# Patient Record
Sex: FEMALE | Race: OTHER | HISPANIC OR LATINO
[De-identification: names, ages, dates, MRNs, and addresses within clinical notes are randomized per-mention and may not be internally consistent; named-entity substitution may affect disease eponyms.]

---

## 2022-02-17 PROBLEM — Z00.00 ENCOUNTER FOR PREVENTIVE HEALTH EXAMINATION: Status: ACTIVE | Noted: 2022-02-17

## 2022-02-23 ENCOUNTER — RESULT REVIEW (OUTPATIENT)
Age: 67
End: 2022-02-23

## 2022-02-23 ENCOUNTER — OUTPATIENT (OUTPATIENT)
Dept: OUTPATIENT SERVICES | Facility: HOSPITAL | Age: 67
LOS: 1 days | End: 2022-02-23
Payer: COMMERCIAL

## 2022-02-23 ENCOUNTER — APPOINTMENT (OUTPATIENT)
Dept: ORTHOPEDIC SURGERY | Facility: CLINIC | Age: 67
End: 2022-02-23
Payer: MEDICARE

## 2022-02-23 VITALS
OXYGEN SATURATION: 98 % | BODY MASS INDEX: 29.02 KG/M2 | HEIGHT: 64 IN | WEIGHT: 170 LBS | DIASTOLIC BLOOD PRESSURE: 80 MMHG | SYSTOLIC BLOOD PRESSURE: 117 MMHG | HEART RATE: 75 BPM

## 2022-02-23 DIAGNOSIS — Z78.9 OTHER SPECIFIED HEALTH STATUS: ICD-10-CM

## 2022-02-23 DIAGNOSIS — Z82.61 FAMILY HISTORY OF ARTHRITIS: ICD-10-CM

## 2022-02-23 DIAGNOSIS — Z60.2 PROBLEMS RELATED TO LIVING ALONE: ICD-10-CM

## 2022-02-23 DIAGNOSIS — Z86.79 PERSONAL HISTORY OF OTHER DISEASES OF THE CIRCULATORY SYSTEM: ICD-10-CM

## 2022-02-23 PROCEDURE — 72170 X-RAY EXAM OF PELVIS: CPT

## 2022-02-23 PROCEDURE — 73564 X-RAY EXAM KNEE 4 OR MORE: CPT | Mod: 26,50

## 2022-02-23 PROCEDURE — 99204 OFFICE O/P NEW MOD 45 MIN: CPT

## 2022-02-23 PROCEDURE — 73564 X-RAY EXAM KNEE 4 OR MORE: CPT

## 2022-02-23 PROCEDURE — 72170 X-RAY EXAM OF PELVIS: CPT | Mod: 26

## 2022-02-23 SDOH — SOCIAL STABILITY - SOCIAL INSECURITY: PROBLEMS RELATED TO LIVING ALONE: Z60.2

## 2022-02-24 PROBLEM — Z60.2 PERSON LIVING ALONE: Status: ACTIVE | Noted: 2022-02-23

## 2022-02-24 PROBLEM — Z86.79 HISTORY OF HYPERTENSION: Status: RESOLVED | Noted: 2022-02-23 | Resolved: 2022-02-24

## 2022-02-24 PROBLEM — Z82.61 FAMILY HISTORY OF ARTHRITIS: Status: ACTIVE | Noted: 2022-02-23

## 2022-02-24 PROBLEM — Z78.9 CONSUMES ALCOHOL OCCASIONALLY: Status: ACTIVE | Noted: 2022-02-23

## 2022-02-24 PROBLEM — Z78.9 EXERCISES OCCASIONALLY: Status: ACTIVE | Noted: 2022-02-23

## 2022-02-24 NOTE — DISCUSSION/SUMMARY
[de-identified] : 65y/o female with bilateral knee osteoarthritis, symptoms similar though with right side more stiff and with radiographically advanced OA than left\par - She is indicated for staged bilateral TKA, Maylin robotic assisted, starting with the RIGHT side first\par - We discussed the details of the procedure, the expected recovery period, and the expected outcome. We discussed the likelihood of satisfaction after complete recovery, and the potential causes of dissatisfaction. The importance of active patient participation in the rehabilitation protocol was emphasized, along with its influence on short and long-term outcomes. Specific risks of total knee replacement were discussed in detail. We discussed the risk of surgical site complications including but not limited to: surgical site infection, wound healing complications, bone fracture, tendon or ligament injury, neurovascular injury, hemorrhage, postoperative stiffness or instability, persistent pain and need for reoperation or manipulation under anesthesia. We discussed surgical blood loss and the possible need for blood transfusion. We discussed the risk of perioperative medical complications, including but not limited to catheter-associated urinary tract infection, venous thromboembolism and other cardiopulmonary complications. We discussed anesthetic options and the risk of anesthesia-related complications. We discussed implant fixation methods; my plan would be to use fully cemented fixation in this case. We discussed the variable need to resurface the patella; my plan would be to resurface in this case. We discussed the durability of prosthetic knees and limitations related to wear, osteolysis and loosening. We discussed the role of the robot in helping to guide bone resections and measure alignment and soft tissue balance. All questions were answered the patient's satisfaction. The patient was given a copy of my preoperative packet with additional information about the procedure. I asked the patient to either call back or schedule a followup appointment for any additional questions or concerns regarding the procedure.\par - Book for surgery at a convenient time\par - Stage left side following appropriate postop recovery from the right\par - Standard medical clearance preoperatively\par - Tylenol and celecoxib as needed

## 2022-02-24 NOTE — REASON FOR VISIT
[Initial Visit] : an initial visit for [Other: ____] : [unfilled] [Interpreters_IDNumber] : [id number] [Interpreters_FullName] : Bhargavi [TWNoteComboBox1] : Grenadian

## 2022-02-24 NOTE — HISTORY OF PRESENT ILLNESS
[Worsening] : worsening [___ yrs] : [unfilled] year(s) ago [10] : a current pain level of 10/10 [Walking] : worsened by walking [Heat] : relieved by heat [de-identified] : 2/23/22: 65y/o female, Grenadian speaking, accompanied by grandson for evaluation of bilateral knee pain and stiffness. Symptoms progressive for the past 4-5 years. Treatments thus far have included PT, Tylenol, various OTC NSAIDs, multiple corticosteroid injections, and bilateral knee arthroscopies (right 2017, left 2018). Complains of ongoing mostly medial pain with any activity. Ambulatory tolerance less than a block but remains able to perform ADLs with pain. Here to discuss TKA.\par \par PMH sig for HTN.\par \par Reports itchiness upon administration of penicillin without anaphylactoid reactions. [de-identified] : describes cramping and shooting pain

## 2022-02-24 NOTE — PHYSICAL EXAM
[de-identified] : General appearance: well nourished and hydrated, pleasant, alert and oriented x 3, cooperative.  \par HEENT: normocephalic, EOM intact, wearing mask, external auditory canal clear.  \par Cardiovascular: no lower leg edema, no varicosities, dorsalis pedis pulses palpable and symmetric.  \par Lymphatics: no palpable lymphadenopathy, no lymphedema.  \par Neurologic: sensation is normal, no muscle weakness in upper or lower extremities, patella tendon reflexes present and symmetric.  \par Dermatologic: skin moist, warm, no rash.  \par Spine: cervical spine with normal lordosis and painless range of motion, thoracic spine with normal kyphosis and painless range of motion, lumbosacral spine with normal lordosis and painless range of motion. \par Gait: bilaterally antalgic\par \par Left knee: all fields negative/normal/unremarkable unless otherwise noted --\par - Focal soft tissue swelling: \par - Ecchymosis: \par - Erythema: \par - Effusion: small\par - Wounds: healed arthroscopic portals\par - Alignment: mild varus\par - Tenderness: diffuse, worst at medial joint line\par - ROM: 0-125\par - Collateral laxity: \par - Cruciate laxity: \par - Meniscal signs: painful Markie and Tracie.  \par - Popliteal angle (degrees): 40\par - Quad strength: \par \par Right knee: all fields negative/normal/unremarkable unless otherwise noted --\par - Focal soft tissue swelling: \par - Ecchymosis: \par - Erythema: \par - Effusion: small\par - Wounds: healed arthroscopic portals\par - Alignment: mild varus\par - Tenderness: diffuse, worst at medial joint line\par - ROM: 5-120\par - Collateral laxity: \par - Cruciate laxity: \par - Meniscal signs: painful Markie and Tracie.  \par - Popliteal angle (degrees): 40\par - Quad strength:  [de-identified] : AP pelvis and 4 views of the bilateral knees (weightbearing AP, weightbearing Orr, weightbearing lateral, and Sunrise) were interpreted by me and reviewed with the patient.\par \par Location of imaging: [facility]\par Date of exam: 2/23/22\par \par Pelvic alignment: relatively outlet\par \par Right hip -- \par Arthritis: none\par Deformity: none\par Osteonecrosis: none\par \par Left hip -- \par Arthritis: none\par Deformity: none\par Osteonecrosis: none\par \par Right knee -- \par Alignment: mild varus\par Arthritis: tricompartmental worst medial, KL4\par Patellar height: normal\par Patellar tracking: central\par \par Left knee -- \par Alignment: mild varus\par Arthritis: tricompartmental worst medial, KL3-4\par Patellar height: normal\par Patellar tracking: central

## 2022-02-24 NOTE — HISTORY OF PRESENT ILLNESS
[Worsening] : worsening [___ yrs] : [unfilled] year(s) ago [10] : a current pain level of 10/10 [Walking] : worsened by walking [Heat] : relieved by heat [de-identified] : 2/23/22: 65y/o female, Barbadian speaking, accompanied by grandson for evaluation of bilateral knee pain and stiffness. Symptoms progressive for the past 4-5 years. Treatments thus far have included PT, Tylenol, various OTC NSAIDs, multiple corticosteroid injections, and bilateral knee arthroscopies (right 2017, left 2018). Complains of ongoing mostly medial pain with any activity. Ambulatory tolerance less than a block but remains able to perform ADLs with pain. Here to discuss TKA.\par \par PMH sig for HTN.\par \par Reports itchiness upon administration of penicillin without anaphylactoid reactions. [de-identified] : describes cramping and shooting pain

## 2022-02-24 NOTE — REASON FOR VISIT
[Initial Visit] : an initial visit for [Other: ____] : [unfilled] [Interpreters_IDNumber] : [id number] [Interpreters_FullName] : Bhargavi [TWNoteComboBox1] : Italian

## 2022-02-24 NOTE — DISCUSSION/SUMMARY
[de-identified] : 67y/o female with bilateral knee osteoarthritis, symptoms similar though with right side more stiff and with radiographically advanced OA than left\par - She is indicated for staged bilateral TKA, Maylin robotic assisted, starting with the RIGHT side first\par - We discussed the details of the procedure, the expected recovery period, and the expected outcome. We discussed the likelihood of satisfaction after complete recovery, and the potential causes of dissatisfaction. The importance of active patient participation in the rehabilitation protocol was emphasized, along with its influence on short and long-term outcomes. Specific risks of total knee replacement were discussed in detail. We discussed the risk of surgical site complications including but not limited to: surgical site infection, wound healing complications, bone fracture, tendon or ligament injury, neurovascular injury, hemorrhage, postoperative stiffness or instability, persistent pain and need for reoperation or manipulation under anesthesia. We discussed surgical blood loss and the possible need for blood transfusion. We discussed the risk of perioperative medical complications, including but not limited to catheter-associated urinary tract infection, venous thromboembolism and other cardiopulmonary complications. We discussed anesthetic options and the risk of anesthesia-related complications. We discussed implant fixation methods; my plan would be to use fully cemented fixation in this case. We discussed the variable need to resurface the patella; my plan would be to resurface in this case. We discussed the durability of prosthetic knees and limitations related to wear, osteolysis and loosening. We discussed the role of the robot in helping to guide bone resections and measure alignment and soft tissue balance. All questions were answered the patient's satisfaction. The patient was given a copy of my preoperative packet with additional information about the procedure. I asked the patient to either call back or schedule a followup appointment for any additional questions or concerns regarding the procedure.\par - Book for surgery at a convenient time\par - Stage left side following appropriate postop recovery from the right\par - Standard medical clearance preoperatively\par - Tylenol and celecoxib as needed

## 2022-02-24 NOTE — PHYSICAL EXAM
[de-identified] : General appearance: well nourished and hydrated, pleasant, alert and oriented x 3, cooperative.  \par HEENT: normocephalic, EOM intact, wearing mask, external auditory canal clear.  \par Cardiovascular: no lower leg edema, no varicosities, dorsalis pedis pulses palpable and symmetric.  \par Lymphatics: no palpable lymphadenopathy, no lymphedema.  \par Neurologic: sensation is normal, no muscle weakness in upper or lower extremities, patella tendon reflexes present and symmetric.  \par Dermatologic: skin moist, warm, no rash.  \par Spine: cervical spine with normal lordosis and painless range of motion, thoracic spine with normal kyphosis and painless range of motion, lumbosacral spine with normal lordosis and painless range of motion. \par Gait: bilaterally antalgic\par \par Left knee: all fields negative/normal/unremarkable unless otherwise noted --\par - Focal soft tissue swelling: \par - Ecchymosis: \par - Erythema: \par - Effusion: small\par - Wounds: healed arthroscopic portals\par - Alignment: mild varus\par - Tenderness: diffuse, worst at medial joint line\par - ROM: 0-125\par - Collateral laxity: \par - Cruciate laxity: \par - Meniscal signs: painful Markie and Tracie.  \par - Popliteal angle (degrees): 40\par - Quad strength: \par \par Right knee: all fields negative/normal/unremarkable unless otherwise noted --\par - Focal soft tissue swelling: \par - Ecchymosis: \par - Erythema: \par - Effusion: small\par - Wounds: healed arthroscopic portals\par - Alignment: mild varus\par - Tenderness: diffuse, worst at medial joint line\par - ROM: 5-120\par - Collateral laxity: \par - Cruciate laxity: \par - Meniscal signs: painful Markie and Tracie.  \par - Popliteal angle (degrees): 40\par - Quad strength:  [de-identified] : AP pelvis and 4 views of the bilateral knees (weightbearing AP, weightbearing Orr, weightbearing lateral, and Sunrise) were interpreted by me and reviewed with the patient.\par \par Location of imaging: [facility]\par Date of exam: 2/23/22\par \par Pelvic alignment: relatively outlet\par \par Right hip -- \par Arthritis: none\par Deformity: none\par Osteonecrosis: none\par \par Left hip -- \par Arthritis: none\par Deformity: none\par Osteonecrosis: none\par \par Right knee -- \par Alignment: mild varus\par Arthritis: tricompartmental worst medial, KL4\par Patellar height: normal\par Patellar tracking: central\par \par Left knee -- \par Alignment: mild varus\par Arthritis: tricompartmental worst medial, KL3-4\par Patellar height: normal\par Patellar tracking: central

## 2022-04-21 ENCOUNTER — APPOINTMENT (OUTPATIENT)
Dept: ORTHOPEDIC SURGERY | Facility: HOSPITAL | Age: 67
End: 2022-04-21

## 2022-05-13 ENCOUNTER — APPOINTMENT (OUTPATIENT)
Dept: ORTHOPEDIC SURGERY | Facility: CLINIC | Age: 67
End: 2022-05-13

## 2022-06-02 ENCOUNTER — OUTPATIENT (OUTPATIENT)
Dept: OUTPATIENT SERVICES | Facility: HOSPITAL | Age: 67
LOS: 1 days | End: 2022-06-02
Payer: COMMERCIAL

## 2022-06-02 DIAGNOSIS — Z01.818 ENCOUNTER FOR OTHER PREPROCEDURAL EXAMINATION: ICD-10-CM

## 2022-06-02 LAB
A1C WITH ESTIMATED AVERAGE GLUCOSE RESULT: 5.2 % — SIGNIFICANT CHANGE UP (ref 4–5.6)
APPEARANCE UR: CLEAR — SIGNIFICANT CHANGE UP
BACTERIA # UR AUTO: PRESENT /HPF
BILIRUB UR-MCNC: NEGATIVE — SIGNIFICANT CHANGE UP
COLOR SPEC: YELLOW — SIGNIFICANT CHANGE UP
DIFF PNL FLD: ABNORMAL
EPI CELLS # UR: SIGNIFICANT CHANGE UP /HPF (ref 0–5)
ESTIMATED AVERAGE GLUCOSE: 103 MG/DL — SIGNIFICANT CHANGE UP (ref 68–114)
GLUCOSE UR QL: NEGATIVE — SIGNIFICANT CHANGE UP
KETONES UR-MCNC: NEGATIVE — SIGNIFICANT CHANGE UP
LEUKOCYTE ESTERASE UR-ACNC: ABNORMAL
NITRITE UR-MCNC: NEGATIVE — SIGNIFICANT CHANGE UP
PH UR: 6.5 — SIGNIFICANT CHANGE UP (ref 5–8)
PROT UR-MCNC: NEGATIVE MG/DL — SIGNIFICANT CHANGE UP
RBC CASTS # UR COMP ASSIST: < 5 /HPF — SIGNIFICANT CHANGE UP
SP GR SPEC: 1.01 — SIGNIFICANT CHANGE UP (ref 1–1.03)
UROBILINOGEN FLD QL: 0.2 E.U./DL — SIGNIFICANT CHANGE UP
WBC UR QL: ABNORMAL /HPF

## 2022-06-02 PROCEDURE — 87086 URINE CULTURE/COLONY COUNT: CPT

## 2022-06-02 PROCEDURE — 83036 HEMOGLOBIN GLYCOSYLATED A1C: CPT

## 2022-06-02 PROCEDURE — 81001 URINALYSIS AUTO W/SCOPE: CPT

## 2022-06-02 PROCEDURE — 87186 SC STD MICRODIL/AGAR DIL: CPT

## 2022-06-03 VITALS
TEMPERATURE: 97 F | HEIGHT: 64 IN | HEART RATE: 70 BPM | SYSTOLIC BLOOD PRESSURE: 113 MMHG | OXYGEN SATURATION: 99 % | RESPIRATION RATE: 16 BRPM | WEIGHT: 176.81 LBS | DIASTOLIC BLOOD PRESSURE: 75 MMHG

## 2022-06-03 RX ORDER — CHLORHEXIDINE GLUCONATE 213 G/1000ML
1 SOLUTION TOPICAL ONCE
Refills: 0 | Status: DISCONTINUED | OUTPATIENT
Start: 2022-06-06 | End: 2022-06-08

## 2022-06-03 RX ORDER — POVIDONE-IODINE 5 %
1 AEROSOL (ML) TOPICAL ONCE
Refills: 0 | Status: COMPLETED | OUTPATIENT
Start: 2022-06-06 | End: 2022-06-06

## 2022-06-03 RX ORDER — ATORVASTATIN CALCIUM 80 MG/1
1 TABLET, FILM COATED ORAL
Qty: 0 | Refills: 0 | DISCHARGE

## 2022-06-03 RX ORDER — IBUPROFEN 200 MG
1 TABLET ORAL
Qty: 0 | Refills: 0 | DISCHARGE

## 2022-06-03 RX ORDER — INFLUENZA VIRUS VACCINE 15; 15; 15; 15 UG/.5ML; UG/.5ML; UG/.5ML; UG/.5ML
0.7 SUSPENSION INTRAMUSCULAR ONCE
Refills: 0 | Status: DISCONTINUED | OUTPATIENT
Start: 2022-06-06 | End: 2022-06-08

## 2022-06-03 NOTE — H&P ADULT - NSHPLABSRESULTS_GEN_ALL_CORE
preop cbc/pt/inr/ptt/bmp - within normal limits, reviewed by medical clearance  ekg nsr, reviewed by medical clearance   CXR: Within normal limits, per medical clearance.  Povidone iodine nasal swab to be given day of surgery   Cr 1.12 preop   echo 5/18 reviewed by med clearance

## 2022-06-03 NOTE — PATIENT PROFILE ADULT - OVER THE PAST TWO WEEKS, HAVE YOU FELT LITTLE INTEREST OR PLEASURE IN DOING THINGS?
Can you guys please help our patient get monoclonal antibody infusion    Dr Maryana Cardenas office is helping patients whos non-[facility] cant schedule
I called and spoke with daughter Emma Williamson  They agreed to schedule a virtual visit with Dr Josephine Gonsalez today to discuss the infusion 
Nicky Aquino  Her PCP has no idea what this is or how to order it    I was asking you all to reach out to [facility] as next step
Patient's family doctors office calling stating the patient's daughter mentioned Dr Lomax advised her to get a monoclonal antibody injection and to contact the family doctor  Their office is calling to inform us they do not order those or even know what it is  I spoke with the patient's daugther and she would like to know where else she can go to get this ordered for her   Please advise
no

## 2022-06-03 NOTE — H&P ADULT - PROBLEM SELECTOR PLAN 1
Admit to Orthopaedic Service.  Presents today for elective right TKR   Pt medically stable and cleared for procedure today by Dr. Melara

## 2022-06-03 NOTE — PATIENT PROFILE ADULT - FALL HARM RISK - UNIVERSAL INTERVENTIONS
Bed in lowest position, wheels locked, appropriate side rails in place/Call bell, personal items and telephone in reach/Instruct patient to call for assistance before getting out of bed or chair/Non-slip footwear when patient is out of bed/[place] to call system/Physically safe environment - no spills, clutter or unnecessary equipment/Purposeful Proactive Rounding/Room/bathroom lighting operational, light cord in reach

## 2022-06-03 NOTE — H&P ADULT - HISTORY OF PRESENT ILLNESS
66F with right knee pain x   Presents for elective right TKR  66F with right knee pain x several years. She has a history of previous right knee arthroscopy without intraop complications. She has pain and limitation when walking. She has exhausted all conservative treatment, ie injections, medication, PT. She currently does not use a cane or walker.     Presents for elective right TKR

## 2022-06-03 NOTE — H&P ADULT - NSHPPHYSICALEXAM_GEN_ALL_CORE
MSK:  decreased ROM right knee 2/2 pain  Remainder of physical exam as per medical clearance note Gen: 67y/o, NAD  MSK: Decreased R knee ROM secondary to pain  Skin without erythema, ecchymosis, abrasions or lesions, signs of infection. well healed superolateral surgical incision R knee  Calves soft, nontender bilaterally   Sensation intact to light touch bilateral lower extremities  Pulses: DP2+ bilat LE, brisk capillary refill  EHL/FHL/TA/GS 5/5 bilaterally     Rest of PE per MD clearance

## 2022-06-04 LAB
-  AMPICILLIN/SULBACTAM: SIGNIFICANT CHANGE UP
-  AMPICILLIN: SIGNIFICANT CHANGE UP
-  CEFAZOLIN: SIGNIFICANT CHANGE UP
-  CEFTRIAXONE: SIGNIFICANT CHANGE UP
-  CIPROFLOXACIN: SIGNIFICANT CHANGE UP
-  ERTAPENEM: SIGNIFICANT CHANGE UP
-  GENTAMICIN: SIGNIFICANT CHANGE UP
-  NITROFURANTOIN: SIGNIFICANT CHANGE UP
-  PIPERACILLIN/TAZOBACTAM: SIGNIFICANT CHANGE UP
-  TOBRAMYCIN: SIGNIFICANT CHANGE UP
-  TRIMETHOPRIM/SULFAMETHOXAZOLE: SIGNIFICANT CHANGE UP
CULTURE RESULTS: SIGNIFICANT CHANGE UP
METHOD TYPE: SIGNIFICANT CHANGE UP
METHOD TYPE: SIGNIFICANT CHANGE UP
ORGANISM # SPEC MICROSCOPIC CNT: SIGNIFICANT CHANGE UP
SPECIMEN SOURCE: SIGNIFICANT CHANGE UP

## 2022-06-05 ENCOUNTER — TRANSCRIPTION ENCOUNTER (OUTPATIENT)
Age: 67
End: 2022-06-05

## 2022-06-06 ENCOUNTER — TRANSCRIPTION ENCOUNTER (OUTPATIENT)
Age: 67
End: 2022-06-06

## 2022-06-06 ENCOUNTER — INPATIENT (INPATIENT)
Facility: HOSPITAL | Age: 67
LOS: 1 days | Discharge: HOME HEALTH | DRG: 470 | End: 2022-06-08
Attending: ORTHOPAEDIC SURGERY | Admitting: ORTHOPAEDIC SURGERY
Payer: MEDICARE

## 2022-06-06 ENCOUNTER — APPOINTMENT (OUTPATIENT)
Dept: ORTHOPEDIC SURGERY | Facility: HOSPITAL | Age: 67
End: 2022-06-06

## 2022-06-06 DIAGNOSIS — M17.11 UNILATERAL PRIMARY OSTEOARTHRITIS, RIGHT KNEE: ICD-10-CM

## 2022-06-06 DIAGNOSIS — Z98.890 OTHER SPECIFIED POSTPROCEDURAL STATES: Chronic | ICD-10-CM

## 2022-06-06 DIAGNOSIS — E78.5 HYPERLIPIDEMIA, UNSPECIFIED: ICD-10-CM

## 2022-06-06 DIAGNOSIS — I10 ESSENTIAL (PRIMARY) HYPERTENSION: ICD-10-CM

## 2022-06-06 PROCEDURE — S2900 ROBOTIC SURGICAL SYSTEM: CPT | Mod: NC

## 2022-06-06 PROCEDURE — 27447 TOTAL KNEE ARTHROPLASTY: CPT | Mod: RT

## 2022-06-06 PROCEDURE — 73560 X-RAY EXAM OF KNEE 1 OR 2: CPT | Mod: 26,RT

## 2022-06-06 DEVICE — PATELLA  ALL POLY VE 32MM: Type: IMPLANTABLE DEVICE | Site: RIGHT | Status: FUNCTIONAL

## 2022-06-06 DEVICE — ZIMMER/NEXGEN HEX HEAD SCREW 3.5MM: Type: IMPLANTABLE DEVICE | Site: RIGHT | Status: FUNCTIONAL

## 2022-06-06 DEVICE — ZIMMER FEMALE HEX SCREW MAGNETIC 2.5MM X 25MM: Type: IMPLANTABLE DEVICE | Site: RIGHT | Status: FUNCTIONAL

## 2022-06-06 DEVICE — IMPLANTABLE DEVICE: Type: IMPLANTABLE DEVICE | Site: RIGHT | Status: FUNCTIONAL

## 2022-06-06 DEVICE — PIN CAS FIX 3.2X150MM: Type: IMPLANTABLE DEVICE | Site: RIGHT | Status: FUNCTIONAL

## 2022-06-06 DEVICE — PIN FIX CAS 3.2X80MM STR: Type: IMPLANTABLE DEVICE | Site: RIGHT | Status: FUNCTIONAL

## 2022-06-06 DEVICE — ZIMMER/NEXGEN SMOOTH PIN 3.2X75MM: Type: IMPLANTABLE DEVICE | Site: RIGHT | Status: FUNCTIONAL

## 2022-06-06 RX ORDER — POLYETHYLENE GLYCOL 3350 17 G/17G
17 POWDER, FOR SOLUTION ORAL AT BEDTIME
Refills: 0 | Status: DISCONTINUED | OUTPATIENT
Start: 2022-06-06 | End: 2022-06-08

## 2022-06-06 RX ORDER — ACETAMINOPHEN 500 MG
1000 TABLET ORAL ONCE
Refills: 0 | Status: COMPLETED | OUTPATIENT
Start: 2022-06-06 | End: 2022-06-06

## 2022-06-06 RX ORDER — OXYCODONE HYDROCHLORIDE 5 MG/1
5 TABLET ORAL
Refills: 0 | Status: DISCONTINUED | OUTPATIENT
Start: 2022-06-06 | End: 2022-06-08

## 2022-06-06 RX ORDER — ATORVASTATIN CALCIUM 80 MG/1
40 TABLET, FILM COATED ORAL AT BEDTIME
Refills: 0 | Status: DISCONTINUED | OUTPATIENT
Start: 2022-06-06 | End: 2022-06-08

## 2022-06-06 RX ORDER — METOPROLOL TARTRATE 50 MG
25 TABLET ORAL DAILY
Refills: 0 | Status: DISCONTINUED | OUTPATIENT
Start: 2022-06-06 | End: 2022-06-08

## 2022-06-06 RX ORDER — ATORVASTATIN CALCIUM 80 MG/1
40 TABLET, FILM COATED ORAL DAILY
Refills: 0 | Status: DISCONTINUED | OUTPATIENT
Start: 2022-06-06 | End: 2022-06-06

## 2022-06-06 RX ORDER — ACETAMINOPHEN 500 MG
975 TABLET ORAL EVERY 8 HOURS
Refills: 0 | Status: DISCONTINUED | OUTPATIENT
Start: 2022-06-06 | End: 2022-06-08

## 2022-06-06 RX ORDER — CEFAZOLIN SODIUM 1 G
2000 VIAL (EA) INJECTION EVERY 8 HOURS
Refills: 0 | Status: COMPLETED | OUTPATIENT
Start: 2022-06-06 | End: 2022-06-07

## 2022-06-06 RX ORDER — ASPIRIN/CALCIUM CARB/MAGNESIUM 324 MG
81 TABLET ORAL
Refills: 0 | Status: DISCONTINUED | OUTPATIENT
Start: 2022-06-07 | End: 2022-06-08

## 2022-06-06 RX ORDER — ONDANSETRON 8 MG/1
4 TABLET, FILM COATED ORAL EVERY 6 HOURS
Refills: 0 | Status: DISCONTINUED | OUTPATIENT
Start: 2022-06-06 | End: 2022-06-08

## 2022-06-06 RX ORDER — CELECOXIB 200 MG/1
200 CAPSULE ORAL EVERY 12 HOURS
Refills: 0 | Status: DISCONTINUED | OUTPATIENT
Start: 2022-06-07 | End: 2022-06-08

## 2022-06-06 RX ORDER — KETOROLAC TROMETHAMINE 30 MG/ML
15 SYRINGE (ML) INJECTION EVERY 6 HOURS
Refills: 0 | Status: DISCONTINUED | OUTPATIENT
Start: 2022-06-06 | End: 2022-06-07

## 2022-06-06 RX ORDER — HYDROMORPHONE HYDROCHLORIDE 2 MG/ML
0.5 INJECTION INTRAMUSCULAR; INTRAVENOUS; SUBCUTANEOUS
Refills: 0 | Status: DISCONTINUED | OUTPATIENT
Start: 2022-06-06 | End: 2022-06-08

## 2022-06-06 RX ORDER — SENNA PLUS 8.6 MG/1
2 TABLET ORAL AT BEDTIME
Refills: 0 | Status: DISCONTINUED | OUTPATIENT
Start: 2022-06-06 | End: 2022-06-08

## 2022-06-06 RX ORDER — OXYCODONE HYDROCHLORIDE 5 MG/1
10 TABLET ORAL
Refills: 0 | Status: DISCONTINUED | OUTPATIENT
Start: 2022-06-06 | End: 2022-06-08

## 2022-06-06 RX ORDER — HYDROCHLOROTHIAZIDE 25 MG
12.5 TABLET ORAL DAILY
Refills: 0 | Status: DISCONTINUED | OUTPATIENT
Start: 2022-06-06 | End: 2022-06-08

## 2022-06-06 RX ORDER — SODIUM CHLORIDE 9 MG/ML
1000 INJECTION, SOLUTION INTRAVENOUS
Refills: 0 | Status: DISCONTINUED | OUTPATIENT
Start: 2022-06-07 | End: 2022-06-08

## 2022-06-06 RX ORDER — APREPITANT 80 MG/1
40 CAPSULE ORAL ONCE
Refills: 0 | Status: COMPLETED | OUTPATIENT
Start: 2022-06-06 | End: 2022-06-06

## 2022-06-06 RX ORDER — PANTOPRAZOLE SODIUM 20 MG/1
40 TABLET, DELAYED RELEASE ORAL
Refills: 0 | Status: DISCONTINUED | OUTPATIENT
Start: 2022-06-06 | End: 2022-06-08

## 2022-06-06 RX ADMIN — Medication 1 APPLICATION(S): at 13:20

## 2022-06-06 RX ADMIN — Medication 15 MILLIGRAM(S): at 19:25

## 2022-06-06 RX ADMIN — POLYETHYLENE GLYCOL 3350 17 GRAM(S): 17 POWDER, FOR SOLUTION ORAL at 21:53

## 2022-06-06 RX ADMIN — Medication 15 MILLIGRAM(S): at 18:55

## 2022-06-06 RX ADMIN — Medication 975 MILLIGRAM(S): at 21:54

## 2022-06-06 RX ADMIN — Medication 975 MILLIGRAM(S): at 22:54

## 2022-06-06 RX ADMIN — Medication 1000 MILLIGRAM(S): at 13:08

## 2022-06-06 RX ADMIN — ATORVASTATIN CALCIUM 40 MILLIGRAM(S): 80 TABLET, FILM COATED ORAL at 21:54

## 2022-06-06 RX ADMIN — SENNA PLUS 2 TABLET(S): 8.6 TABLET ORAL at 21:53

## 2022-06-06 RX ADMIN — APREPITANT 40 MILLIGRAM(S): 80 CAPSULE ORAL at 13:07

## 2022-06-06 RX ADMIN — Medication 12.5 MILLIGRAM(S): at 21:53

## 2022-06-07 ENCOUNTER — TRANSCRIPTION ENCOUNTER (OUTPATIENT)
Age: 67
End: 2022-06-07

## 2022-06-07 LAB
ANION GAP SERPL CALC-SCNC: 11 MMOL/L — SIGNIFICANT CHANGE UP (ref 5–17)
BUN SERPL-MCNC: 19 MG/DL — SIGNIFICANT CHANGE UP (ref 7–23)
CALCIUM SERPL-MCNC: 8.9 MG/DL — SIGNIFICANT CHANGE UP (ref 8.4–10.5)
CHLORIDE SERPL-SCNC: 102 MMOL/L — SIGNIFICANT CHANGE UP (ref 96–108)
CO2 SERPL-SCNC: 24 MMOL/L — SIGNIFICANT CHANGE UP (ref 22–31)
CREAT SERPL-MCNC: 1.02 MG/DL — SIGNIFICANT CHANGE UP (ref 0.5–1.3)
EGFR: 61 ML/MIN/1.73M2 — SIGNIFICANT CHANGE UP
GLUCOSE SERPL-MCNC: 142 MG/DL — HIGH (ref 70–99)
HCT VFR BLD CALC: 35.9 % — SIGNIFICANT CHANGE UP (ref 34.5–45)
HCV AB S/CO SERPL IA: 0.05 S/CO — SIGNIFICANT CHANGE UP
HCV AB SERPL-IMP: SIGNIFICANT CHANGE UP
HGB BLD-MCNC: 12.3 G/DL — SIGNIFICANT CHANGE UP (ref 11.5–15.5)
MCHC RBC-ENTMCNC: 31.4 PG — SIGNIFICANT CHANGE UP (ref 27–34)
MCHC RBC-ENTMCNC: 34.3 GM/DL — SIGNIFICANT CHANGE UP (ref 32–36)
MCV RBC AUTO: 91.6 FL — SIGNIFICANT CHANGE UP (ref 80–100)
NRBC # BLD: 0 /100 WBCS — SIGNIFICANT CHANGE UP (ref 0–0)
PLATELET # BLD AUTO: 212 K/UL — SIGNIFICANT CHANGE UP (ref 150–400)
POTASSIUM SERPL-MCNC: 4.4 MMOL/L — SIGNIFICANT CHANGE UP (ref 3.5–5.3)
POTASSIUM SERPL-SCNC: 4.4 MMOL/L — SIGNIFICANT CHANGE UP (ref 3.5–5.3)
RBC # BLD: 3.92 M/UL — SIGNIFICANT CHANGE UP (ref 3.8–5.2)
RBC # FLD: 11.8 % — SIGNIFICANT CHANGE UP (ref 10.3–14.5)
SODIUM SERPL-SCNC: 137 MMOL/L — SIGNIFICANT CHANGE UP (ref 135–145)
WBC # BLD: 7.37 K/UL — SIGNIFICANT CHANGE UP (ref 3.8–10.5)
WBC # FLD AUTO: 7.37 K/UL — SIGNIFICANT CHANGE UP (ref 3.8–10.5)

## 2022-06-07 PROCEDURE — 99222 1ST HOSP IP/OBS MODERATE 55: CPT

## 2022-06-07 RX ORDER — ACETAMINOPHEN 500 MG
2 TABLET ORAL
Qty: 0 | Refills: 0 | DISCHARGE

## 2022-06-07 RX ORDER — OXYCODONE HYDROCHLORIDE 5 MG/1
1 TABLET ORAL
Qty: 0 | Refills: 0 | DISCHARGE
Start: 2022-06-07

## 2022-06-07 RX ORDER — PANTOPRAZOLE SODIUM 20 MG/1
1 TABLET, DELAYED RELEASE ORAL
Qty: 0 | Refills: 0 | DISCHARGE
Start: 2022-06-07

## 2022-06-07 RX ORDER — CELECOXIB 200 MG/1
1 CAPSULE ORAL
Qty: 0 | Refills: 0 | DISCHARGE
Start: 2022-06-07

## 2022-06-07 RX ORDER — ASPIRIN/CALCIUM CARB/MAGNESIUM 324 MG
1 TABLET ORAL
Qty: 0 | Refills: 0 | DISCHARGE
Start: 2022-06-07

## 2022-06-07 RX ORDER — SENNA PLUS 8.6 MG/1
2 TABLET ORAL
Qty: 0 | Refills: 0 | DISCHARGE
Start: 2022-06-07

## 2022-06-07 RX ADMIN — Medication 12.5 MILLIGRAM(S): at 06:12

## 2022-06-07 RX ADMIN — Medication 81 MILLIGRAM(S): at 18:38

## 2022-06-07 RX ADMIN — Medication 15 MILLIGRAM(S): at 13:06

## 2022-06-07 RX ADMIN — Medication 15 MILLIGRAM(S): at 07:12

## 2022-06-07 RX ADMIN — CELECOXIB 200 MILLIGRAM(S): 200 CAPSULE ORAL at 18:38

## 2022-06-07 RX ADMIN — Medication 15 MILLIGRAM(S): at 00:42

## 2022-06-07 RX ADMIN — POLYETHYLENE GLYCOL 3350 17 GRAM(S): 17 POWDER, FOR SOLUTION ORAL at 21:45

## 2022-06-07 RX ADMIN — Medication 975 MILLIGRAM(S): at 16:04

## 2022-06-07 RX ADMIN — Medication 15 MILLIGRAM(S): at 06:12

## 2022-06-07 RX ADMIN — Medication 100 MILLIGRAM(S): at 06:13

## 2022-06-07 RX ADMIN — Medication 100 MILLIGRAM(S): at 00:11

## 2022-06-07 RX ADMIN — Medication 975 MILLIGRAM(S): at 21:45

## 2022-06-07 RX ADMIN — CELECOXIB 200 MILLIGRAM(S): 200 CAPSULE ORAL at 06:12

## 2022-06-07 RX ADMIN — Medication 15 MILLIGRAM(S): at 12:51

## 2022-06-07 RX ADMIN — Medication 975 MILLIGRAM(S): at 06:12

## 2022-06-07 RX ADMIN — Medication 975 MILLIGRAM(S): at 17:04

## 2022-06-07 RX ADMIN — CELECOXIB 200 MILLIGRAM(S): 200 CAPSULE ORAL at 19:38

## 2022-06-07 RX ADMIN — ATORVASTATIN CALCIUM 40 MILLIGRAM(S): 80 TABLET, FILM COATED ORAL at 21:45

## 2022-06-07 RX ADMIN — Medication 975 MILLIGRAM(S): at 07:12

## 2022-06-07 RX ADMIN — PANTOPRAZOLE SODIUM 40 MILLIGRAM(S): 20 TABLET, DELAYED RELEASE ORAL at 06:12

## 2022-06-07 RX ADMIN — Medication 975 MILLIGRAM(S): at 22:45

## 2022-06-07 RX ADMIN — Medication 15 MILLIGRAM(S): at 00:12

## 2022-06-07 RX ADMIN — Medication 25 MILLIGRAM(S): at 06:13

## 2022-06-07 RX ADMIN — Medication 81 MILLIGRAM(S): at 06:13

## 2022-06-07 RX ADMIN — CELECOXIB 200 MILLIGRAM(S): 200 CAPSULE ORAL at 07:12

## 2022-06-07 RX ADMIN — SENNA PLUS 2 TABLET(S): 8.6 TABLET ORAL at 21:45

## 2022-06-07 NOTE — DISCHARGE NOTE PROVIDER - NSDCHHBASESERVICE_GEN_ALL_CORE
Physical therapy
Alert and oriented to person, place, time/situation. normal mood and affect. no apparent risk to self or others.

## 2022-06-07 NOTE — DISCHARGE NOTE NURSING/CASE MANAGEMENT/SOCIAL WORK - PATIENT PORTAL LINK FT
You can access the FollowMyHealth Patient Portal offered by [facility] by registering at the following website: [email or web address]. By joining [employer]’s FollowMyHealth portal, you will also be able to view your health information using other applications (apps) compatible with our system.

## 2022-06-07 NOTE — PHYSICAL THERAPY INITIAL EVALUATION ADULT - GAIT DEVIATIONS NOTED, PT EVAL
Demo steady gait with use of RW, no LOB observed and good navigation of turns and hallway obstacles./decreased alex/increased time in double stance/decreased step length/decreased stride length/decreased weight-shifting ability

## 2022-06-07 NOTE — CONSULT NOTE ADULT - ASSESSMENT
66F w R knee scope, R knee OA, HTN, HLD, GERD here for R TKR w Dr. Olvera 6/6, for HPT    #Post op state - Pain controlled. PPx: ASA. On bowel regimen and incentive spirometer  #R Knee OA - per ortho  #HTN - pt reports orthostasis. Home on HCTZ 12.5 and toprol 25. BPs at target  #HLD - c/w atorva 40  #Obesity - BMI 30. Affects all aspects of care    Recommend  Hold HCTZ 12.5 mg daily until f/u w PCP. Check BP daily. Discussed w pt and daughter at bedside  optimized for disposition home    DISPO: HPT

## 2022-06-07 NOTE — DISCHARGE NOTE PROVIDER - NSDCMRMEDTOKEN_GEN_ALL_CORE_FT
atorvastatin 40 mg oral tablet: 1 tab(s) orally once a day  hydroCHLOROthiazide 12.5 mg oral capsule: 1 cap(s) orally once a day  oxyCODONE 10 mg oral tablet: 1 tab(s) orally every 3 hours, As needed, Severe Pain (7 - 10)  oxyCODONE 5 mg oral tablet: 1 tab(s) orally every 3 hours, As needed, Moderate Pain (4 - 6)  Toprol-XL 25 mg oral tablet, extended release: 1 tab(s) orally once a day  Tylenol 500 mg oral tablet: 2 tab(s) orally every 6 hours   aspirin 81 mg oral delayed release tablet: 1 tab(s) orally 2 times a day  atorvastatin 40 mg oral tablet: 1 tab(s) orally once a day  celecoxib 200 mg oral capsule: 1 cap(s) orally every 12 hours  hydroCHLOROthiazide 12.5 mg oral capsule: 1 cap(s) orally once a day  oxyCODONE 5 mg oral tablet: 1-2 tab(s) orally every 6 hours, As needed, for moderate to severe pain   pantoprazole 40 mg oral delayed release tablet: 1 tab(s) orally once a day (before a meal)  senna oral tablet: 2 tab(s) orally once a day (at bedtime)  Toprol-XL 25 mg oral tablet, extended release: 1 tab(s) orally once a day  Tylenol 500 mg oral tablet: 2 tab(s) orally every 8 hours   aspirin 81 mg oral delayed release tablet: 1 tab(s) orally 2 times a day  atorvastatin 40 mg oral tablet: 1 tab(s) orally once a day  celecoxib 200 mg oral capsule: 1 cap(s) orally every 12 hours  oxyCODONE 5 mg oral tablet: 1-2 tab(s) orally every 6 hours, As needed, for moderate to severe pain   pantoprazole 40 mg oral delayed release tablet: 1 tab(s) orally once a day (before a meal)  senna oral tablet: 2 tab(s) orally once a day (at bedtime)  Toprol-XL 25 mg oral tablet, extended release: 1 tab(s) orally once a day  Tylenol 500 mg oral tablet: 2 tab(s) orally every 8 hours

## 2022-06-07 NOTE — DISCHARGE NOTE PROVIDER - NSDCFUADDINST_GEN_ALL_CORE_FT
Weight bear as tolerated with assistive device.  No strenuous activity, heavy lifting, driving or returning to work until cleared by MD.  You may shower - dressing is water-resistant, no soaking in bathtubs.  Remove dressing after post op day 5-7, then leave incision open to air. Keep incision clean and dry.  Try to have regular bowel movements, take stool softener or laxative if necessary.  May take Pepcid or Prilosec for upset stomach.  May take Aleve or Naproxen if needed.  Do not apply any creams or ointments on the incision or around the incision/dressing.  Swelling may travel all the way down leg to foot, this is normal and will subside in a few weeks.  Call to schedule an appt with Dr. Olvera for follow up, if you have staples or sutures they will be removed in office.  Contact your doctor if you experience: fever greater than 101.5, chills, chest pain, difficulty breathing, redness or excessive drainage around the incision, other concerns.  Follow up with your primary care provider.    Refer to     Weight bear as tolerated with assistive device.  No strenuous activity, heavy lifting, driving or returning to work until cleared by MD.  You may shower - dressing is water-resistant, no soaking in bathtubs.  Remove dressing after post op day 5-7, then leave incision open to air. Keep incision clean and dry.  Try to have regular bowel movements, take stool softener or laxative if necessary.  May take Pepcid or Prilosec for upset stomach.  May take Aleve or Naproxen if needed.  Do not apply any creams or ointments on the incision or around the incision/dressing.  Swelling may travel all the way down leg to foot, this is normal and will subside in a few weeks.  Call to schedule an appt with Dr. Olvera for follow up, if you have staples or sutures they will be removed in office.  Contact your doctor if you experience: fever greater than 101.5, chills, chest pain, difficulty breathing, redness or excessive drainage around the incision, other concerns.  Follow up with your primary care provider.    Hold hydrochlorothiazide blood pressure medicine  Follow Dr. Barry discharge packet.

## 2022-06-07 NOTE — DISCHARGE NOTE NURSING/CASE MANAGEMENT/SOCIAL WORK - NSDCPEFALRISK_GEN_ALL_CORE
For information on Fall & Injury Prevention, visit: [email or web address] OR  [email or web address] OR  https://www.cdc.gov/steadi/patient.html

## 2022-06-07 NOTE — DISCHARGE NOTE PROVIDER - HOSPITAL COURSE
Admitted  Surgery  Emma-op Antibiotics  Pain control  DVT prophylaxis  OOB/Physical Therapy Admitted- 6-6-2022  Surgery- s/p Right TKA  Emma-op Antibiotics  Pain control  DVT prophylaxis  OOB/Physical Therapy

## 2022-06-07 NOTE — PHYSICAL THERAPY INITIAL EVALUATION ADULT - GENERAL OBSERVATIONS, REHAB EVAL
Patient received semi supine in bed +heplock IV, +R knee dressing/cryocuff C/D/I, +(B) SCDs, NAD, willing to work with PT.

## 2022-06-07 NOTE — DISCHARGE NOTE PROVIDER - NSDCCPTREATMENT_GEN_ALL_CORE_FT
PRINCIPAL PROCEDURE  Procedure: Right total knee replacement  Findings and Treatment: right knee osteoarthritis

## 2022-06-07 NOTE — DISCHARGE NOTE PROVIDER - CARE PROVIDER_API CALL
Diogo Olvera)  Orthopedics  [address] [place] [address]  Phone: [phone]  Fax: [phone]  Follow Up Time: 1 week

## 2022-06-07 NOTE — DISCHARGE NOTE PROVIDER - NSDCFUSCHEDAPPT_GEN_ALL_CORE_FT
Diogo Olvera  [facility] Physician [facility]  ORTHOSURG [address]  Scheduled Appointment: 06/29/2022

## 2022-06-07 NOTE — CONSULT NOTE ADULT - SUBJECTIVE AND OBJECTIVE BOX
HPI "66F with right knee pain x several years. She has a history of previous right knee arthroscopy without intraop complications. She has pain and limitation when walking. She has exhausted all conservative treatment, ie injections, medication, PT. She currently does not use a cane or walker.     Presents for elective right TKR  (03 Jun 2022 09:43)"    66F w R knee scope, R knee OA, HTN, HLD, GERD here for R TKR w Dr. Olvera 6/6, for HPT    Pt reports progressive R knee pain not helped w conservative mgmt. Today states pain in knee is controlled. Reports some dizziness w positional transfers. No chest pain, fever, dyspnea, nausea, abd pain. +Flatus wo BM. Voiding wo dysuria. Pt eager to return home    ROS: 12 point ROS reviewed and otherwise negative  FH: Denies DVT/PE   SH: Never smoker.      PAST MEDICAL & SURGICAL HISTORY:  Hypertension      Hyperlipemia      History of hysteroscopy        Home Meds: Home Medications:  aspirin 81 mg oral delayed release tablet: 1 tab(s) orally 2 times a day (07 Jun 2022 15:06)  atorvastatin 40 mg oral tablet: 1 tab(s) orally once a day (03 Jun 2022 15:55)  celecoxib 200 mg oral capsule: 1 cap(s) orally every 12 hours (07 Jun 2022 15:06)  hydroCHLOROthiazide 12.5 mg oral capsule: 1 cap(s) orally once a day (03 Jun 2022 15:55)  oxyCODONE 5 mg oral tablet: 1-2 tab(s) orally every 6 hours, As needed, for moderate to severe pain  (07 Jun 2022 15:06)  pantoprazole 40 mg oral delayed release tablet: 1 tab(s) orally once a day (before a meal) (07 Jun 2022 15:06)  senna oral tablet: 2 tab(s) orally once a day (at bedtime) (07 Jun 2022 15:06)  Toprol-XL 25 mg oral tablet, extended release: 1 tab(s) orally once a day (03 Jun 2022 15:55)  Tylenol 500 mg oral tablet: 2 tab(s) orally every 8 hours (07 Jun 2022 15:06)    Allergies: Allergies    penicillin (Pruritus)    Intolerances      Soc:   Advanced Directives: Presumed Full Code     CURRENT MEDICATIONS:   --------------------------------------------------------------------------------------  Neurologic Medications  acetaminophen     Tablet .. 975 milliGRAM(s) Oral every 8 hours  celecoxib 200 milliGRAM(s) Oral every 12 hours  HYDROmorphone  Injectable 0.5 milliGRAM(s) IV Push every 15 minutes PRN breakthrough pain  ondansetron Injectable 4 milliGRAM(s) IV Push every 6 hours PRN Nausea and/or Vomiting  oxyCODONE    IR 5 milliGRAM(s) Oral every 3 hours PRN Moderate Pain (4 - 6)  oxyCODONE    IR 10 milliGRAM(s) Oral every 3 hours PRN Severe Pain (7 - 10)    Respiratory Medications    Cardiovascular Medications  hydrochlorothiazide 12.5 milliGRAM(s) Oral daily  metoprolol succinate ER 25 milliGRAM(s) Oral daily    Gastrointestinal Medications  lactated ringers. 1000 milliLiter(s) IV Continuous <Continuous>  pantoprazole    Tablet 40 milliGRAM(s) Oral before breakfast  polyethylene glycol 3350 17 Gram(s) Oral at bedtime  senna 2 Tablet(s) Oral at bedtime    Genitourinary Medications    Hematologic/Oncologic Medications  aspirin enteric coated 81 milliGRAM(s) Oral two times a day  influenza  Vaccine (HIGH DOSE) 0.7 milliLiter(s) IntraMuscular once    Antimicrobial/Immunologic Medications    Endocrine/Metabolic Medications  atorvastatin 40 milliGRAM(s) Oral at bedtime    Topical/Other Medications  chlorhexidine 2% Cloths 1 Application(s) Topical once    --------------------------------------------------------------------------------------    VITAL SIGNS, INS/OUTS (last 24 hours):  --------------------------------------------------------------------------------------  ICU Vital Signs Last 24 Hrs  T(C): 36.8 (07 Jun 2022 15:50), Max: 36.8 (07 Jun 2022 06:15)  T(F): 98.2 (07 Jun 2022 15:50), Max: 98.2 (07 Jun 2022 06:15)  HR: 73 (07 Jun 2022 15:50) (66 - 84)  BP: 104/61 (07 Jun 2022 15:50) (104/61 - 159/83)  BP(mean): 95 (07 Jun 2022 06:20) (84 - 107)  ABP: --  ABP(mean): --  RR: 18 (07 Jun 2022 15:50) (12 - 18)  SpO2: 99% (07 Jun 2022 15:50) (98% - 100%)    I&O's Summary    06 Jun 2022 07:01  -  07 Jun 2022 07:00  --------------------------------------------------------  IN: 4610 mL / OUT: 1100 mL / NET: 3510 mL      --------------------------------------------------------------------------------------    EXAM:  GEN: female in NAD on RA  HEENT: NC/AT, MMM  CV: RRR, nml S1S2, no murmurs  PULM: nml effort, CTAB  ABD: Soft, non-distended, NABS, non-tender  NEURO  A/O x3, moving all extremities, Sensation intact  R knee dressing c/d/i. Minimal swelling. 4/5 strength in R knee d/t pain. 5/5 in plantarflex/ext  PSYCH: Appropriate      LABS  --------------------------------------------------------------------------------------  Labs:  CAPILLARY BLOOD GLUCOSE                              12.3   7.37  )-----------( 212      ( 07 Jun 2022 05:04 )             35.9         06-07    137  |  102  |  19  ----------------------------<  142<H>  4.4   |  24  |  1.02      Calcium, Total Serum: 8.9 mg/dL (06-07-22 @ 05:04)      LFTs:         Coags:                  --------------------------------------------------------------------------------------    OTHER LABS    IMAGING RESULTS  ****************

## 2022-06-08 VITALS
TEMPERATURE: 98 F | DIASTOLIC BLOOD PRESSURE: 76 MMHG | OXYGEN SATURATION: 99 % | SYSTOLIC BLOOD PRESSURE: 122 MMHG | RESPIRATION RATE: 17 BRPM | HEART RATE: 64 BPM

## 2022-06-08 LAB
ANION GAP SERPL CALC-SCNC: 8 MMOL/L — SIGNIFICANT CHANGE UP (ref 5–17)
BUN SERPL-MCNC: 32 MG/DL — HIGH (ref 7–23)
CALCIUM SERPL-MCNC: 8.5 MG/DL — SIGNIFICANT CHANGE UP (ref 8.4–10.5)
CHLORIDE SERPL-SCNC: 104 MMOL/L — SIGNIFICANT CHANGE UP (ref 96–108)
CO2 SERPL-SCNC: 26 MMOL/L — SIGNIFICANT CHANGE UP (ref 22–31)
CREAT SERPL-MCNC: 1.22 MG/DL — SIGNIFICANT CHANGE UP (ref 0.5–1.3)
EGFR: 49 ML/MIN/1.73M2 — LOW
GLUCOSE SERPL-MCNC: 124 MG/DL — HIGH (ref 70–99)
HCT VFR BLD CALC: 31 % — LOW (ref 34.5–45)
HGB BLD-MCNC: 10.3 G/DL — LOW (ref 11.5–15.5)
MCHC RBC-ENTMCNC: 30.9 PG — SIGNIFICANT CHANGE UP (ref 27–34)
MCHC RBC-ENTMCNC: 33.2 GM/DL — SIGNIFICANT CHANGE UP (ref 32–36)
MCV RBC AUTO: 93.1 FL — SIGNIFICANT CHANGE UP (ref 80–100)
NRBC # BLD: 0 /100 WBCS — SIGNIFICANT CHANGE UP (ref 0–0)
PLATELET # BLD AUTO: 173 K/UL — SIGNIFICANT CHANGE UP (ref 150–400)
POTASSIUM SERPL-MCNC: 4.3 MMOL/L — SIGNIFICANT CHANGE UP (ref 3.5–5.3)
POTASSIUM SERPL-SCNC: 4.3 MMOL/L — SIGNIFICANT CHANGE UP (ref 3.5–5.3)
RBC # BLD: 3.33 M/UL — LOW (ref 3.8–5.2)
RBC # FLD: 12.4 % — SIGNIFICANT CHANGE UP (ref 10.3–14.5)
SODIUM SERPL-SCNC: 138 MMOL/L — SIGNIFICANT CHANGE UP (ref 135–145)
WBC # BLD: 6.27 K/UL — SIGNIFICANT CHANGE UP (ref 3.8–10.5)
WBC # FLD AUTO: 6.27 K/UL — SIGNIFICANT CHANGE UP (ref 3.8–10.5)

## 2022-06-08 PROCEDURE — 80048 BASIC METABOLIC PNL TOTAL CA: CPT

## 2022-06-08 PROCEDURE — 97161 PT EVAL LOW COMPLEX 20 MIN: CPT

## 2022-06-08 PROCEDURE — S2900: CPT

## 2022-06-08 PROCEDURE — C1713: CPT

## 2022-06-08 PROCEDURE — 97116 GAIT TRAINING THERAPY: CPT

## 2022-06-08 PROCEDURE — 99232 SBSQ HOSP IP/OBS MODERATE 35: CPT

## 2022-06-08 PROCEDURE — 86803 HEPATITIS C AB TEST: CPT

## 2022-06-08 PROCEDURE — 85027 COMPLETE CBC AUTOMATED: CPT

## 2022-06-08 PROCEDURE — C1776: CPT

## 2022-06-08 PROCEDURE — 73560 X-RAY EXAM OF KNEE 1 OR 2: CPT

## 2022-06-08 PROCEDURE — 36415 COLL VENOUS BLD VENIPUNCTURE: CPT

## 2022-06-08 RX ADMIN — Medication 81 MILLIGRAM(S): at 05:47

## 2022-06-08 RX ADMIN — CELECOXIB 200 MILLIGRAM(S): 200 CAPSULE ORAL at 06:47

## 2022-06-08 RX ADMIN — Medication 975 MILLIGRAM(S): at 05:47

## 2022-06-08 RX ADMIN — Medication 975 MILLIGRAM(S): at 06:47

## 2022-06-08 RX ADMIN — CELECOXIB 200 MILLIGRAM(S): 200 CAPSULE ORAL at 05:47

## 2022-06-08 RX ADMIN — PANTOPRAZOLE SODIUM 40 MILLIGRAM(S): 20 TABLET, DELAYED RELEASE ORAL at 05:47

## 2022-06-08 NOTE — PROGRESS NOTE ADULT - SUBJECTIVE AND OBJECTIVE BOX
INTERVAL HPI/OVERNIGHT EVENTS:    SUBJECTIVE: Patient seen and examined at bedside.   Pt feels well overall. Pain is controlled. No LH/Dizziness, chest pain, dyspnea. Eating wo N/V/Abd pain. +Flatus wo BM. Voiding wo difficulty    OBJECTIVE:    VITAL SIGNS:  ICU Vital Signs Last 24 Hrs  T(C): 36.9 (08 Jun 2022 09:04), Max: 36.9 (08 Jun 2022 05:50)  T(F): 98.4 (08 Jun 2022 09:04), Max: 98.4 (08 Jun 2022 05:50)  HR: 64 (08 Jun 2022 09:04) (64 - 73)  BP: 122/76 (08 Jun 2022 09:04) (94/56 - 122/76)  BP(mean): 83 (08 Jun 2022 05:50) (68 - 83)  ABP: --  ABP(mean): --  RR: 17 (08 Jun 2022 09:04) (15 - 18)  SpO2: 99% (08 Jun 2022 09:04) (96% - 99%)        CAPILLARY BLOOD GLUCOSE          PHYSICAL EXAM:  GEN: female in NAD on RA  HEENT: NC/AT, MMM  CV: RRR, nml S1S2, no murmurs  PULM: nml effort, CTAB  ABD: Soft, non-distended, NABS, non-tender  NEURO  A/O x3, moving all extremities, Sensation intact  R knee dressing c/d/i. Minimal swelling. 4/5 strength in R knee d/t pain. 5/5 in plantarflex/ext  PSYCH: Appropriate      MEDICATIONS:  MEDICATIONS  (STANDING):  acetaminophen     Tablet .. 975 milliGRAM(s) Oral every 8 hours  aspirin enteric coated 81 milliGRAM(s) Oral two times a day  atorvastatin 40 milliGRAM(s) Oral at bedtime  celecoxib 200 milliGRAM(s) Oral every 12 hours  chlorhexidine 2% Cloths 1 Application(s) Topical once  hydrochlorothiazide 12.5 milliGRAM(s) Oral daily  influenza  Vaccine (HIGH DOSE) 0.7 milliLiter(s) IntraMuscular once  lactated ringers. 1000 milliLiter(s) (110 mL/Hr) IV Continuous <Continuous>  metoprolol succinate ER 25 milliGRAM(s) Oral daily  pantoprazole    Tablet 40 milliGRAM(s) Oral before breakfast  polyethylene glycol 3350 17 Gram(s) Oral at bedtime  senna 2 Tablet(s) Oral at bedtime    MEDICATIONS  (PRN):  bisacodyl Suppository 10 milliGRAM(s) Rectal once PRN Constipation  HYDROmorphone  Injectable 0.5 milliGRAM(s) IV Push every 15 minutes PRN breakthrough pain  ondansetron Injectable 4 milliGRAM(s) IV Push every 6 hours PRN Nausea and/or Vomiting  oxyCODONE    IR 5 milliGRAM(s) Oral every 3 hours PRN Moderate Pain (4 - 6)  oxyCODONE    IR 10 milliGRAM(s) Oral every 3 hours PRN Severe Pain (7 - 10)      ALLERGIES:  Allergies    penicillin (Pruritus)    Intolerances        LABS:                        10.3   6.27  )-----------( 173      ( 08 Jun 2022 05:30 )             31.0     06-08    138  |  104  |  32<H>  ----------------------------<  124<H>  4.3   |  26  |  1.22    Ca    8.5      08 Jun 2022 05:30            RADIOLOGY & ADDITIONAL TESTS: Reviewed.
Ortho Post Op Check    Procedure:  R TKA   Surgeon: Dr. ELIDA Olvera    Pt comfortable without complaints, pain controlled  Denies CP, SOB, N/V, numbness/tingling     Vital Signs Last 24 Hrs  T(C): 35.9 (06-06-22 @ 16:58), Max: 35.9 (06-06-22 @ 16:58)  T(F): 96.7 (06-06-22 @ 16:58), Max: 96.7 (06-06-22 @ 16:58)  HR: 64 (06-06-22 @ 17:13) (64 - 74)  BP: 123/63 (06-06-22 @ 17:13) (107/51 - 123/63)  BP(mean): 87 (06-06-22 @ 17:13) (73 - 87)  RR: 13 (06-06-22 @ 17:13) (13 - 17)  SpO2: 100% (06-06-22 @ 17:13) (98% - 100%)    General: Pt Alert and oriented, NAD  R knee Cryocuff in place w/ ACE   Pulses: 2+ DP  Sensation: SILT grossly SPN/DPN/Saph/Linda/Tib  Motor: 5/5 TA/GS/EHL, Quad/Psoas firing limited 2/2 pain    Post-op X-Ray: hardware intact w/o fracture    A/P: 66yFemale s/p R TKA by Dr. ELIDA Olvera on 06-06  - Stable  - Pain Control  - DVT ppx: ASA   - Post op abx: Ancef  - WBS: WBAT  - PT eval     Ortho Pager [phone]
Ortho Note    Pt comfortable without complaints, pain controlled  Denies CP, SOB, N/V, numbness/tingling     Vital Signs Last 24 Hrs  T(C): 36.8 (06-07-22 @ 06:15), Max: 36.8 (06-07-22 @ 06:15)  T(F): 98.2 (06-07-22 @ 06:15), Max: 98.2 (06-07-22 @ 06:15)  HR: 80 (06-07-22 @ 06:20) (70 - 80)  BP: 139/72 (06-07-22 @ 06:20) (132/78 - 139/72)  BP(mean): 95 (06-07-22 @ 06:20) (95 - 96)  RR: 16 (06-07-22 @ 06:15) (16 - 16)  SpO2: 99% (06-07-22 @ 06:15) (99% - 99%)  AVSS    General: Pt Alert and oriented, NAD  DSG C/D/I  Pulses: palpable DP   Sensation: SILT throughout distal extremity   Motor: 5/5 EHL/FHL/TA/GS        A/P: 66yFemale POD#1 s/p R TKA  - Stable  - Pain Control  - DVT ppx: asa  - PT, WBS: WBAT    Ortho Pager [phone]
Ortho Note    Pt comfortable without complaints, pain controlled  Denies CP, SOB, N/V, numbness/tingling     Vital Signs Last 24 Hrs  T(C): 36.9 (06-08-22 @ 05:50), Max: 36.9 (06-08-22 @ 05:50)  T(F): 98.4 (06-08-22 @ 05:50), Max: 98.4 (06-08-22 @ 05:50)  HR: 67 (06-08-22 @ 05:50) (67 - 70)  BP: 112/68 (06-08-22 @ 05:50) (102/65 - 112/68)  BP(mean): 83 (06-08-22 @ 05:50) (83 - 83)  RR: 16 (06-08-22 @ 05:50) (15 - 16)  SpO2: 98% (06-08-22 @ 05:50) (97% - 98%)    General: Pt Alert and oriented, NAD  DSG C/D/I  Pulses: palpable DP   Sensation: SILT throughout distal extremity   Motor: 5/5 EHL/FHL/TA/GS        A/P: 66yFemale POD#2 s/p R TKA  - Stable  - Pain Control  - DVT ppx: asa  - PT, WBS: WBAT    Ortho Pager [phone]
Ortho Note    Subjective:  Pt comfortable without complaints, pain controlled with current pain medication regimen   Denies CP, SOB, N/V, numbness/tingling       Vital Signs Last 24 Hrs  T(C): --  T(F): --  HR: --  BP: --  BP(mean): --  RR: --  SpO2: --  AVSS    Objective:    Physical Exam:  General: Pt Alert and oriented, NAD  Right Knee aquacel DSG C/D/I  Pulses: +2 pedal pulses, wwp toes,   Sensation: silt intact to bilateral lower extremities  Motor: EHL/FHL/TA/GS- firing        Plan of Care:  A/P: 66yFemale POD#1 s/p Right TKA  - afebrile, nontoxic apperance  - Pain Control- tylenol 975mg Po Q8h, celebrex 200mg PO Q12h, toradol 15mg Q6h x4 doses, Oxycodone 5-10mg PO Q3h prn moderate to severe pain,  - DVT ppx:  aspirin 81mg PO BID   - PT, WBS: WBAT  - bowel regimen, IS use  - appreciate medicine recs  - DIspo- home pending pt clearance    Ortho Pager [phone]

## 2022-06-10 DIAGNOSIS — I10 ESSENTIAL (PRIMARY) HYPERTENSION: ICD-10-CM

## 2022-06-10 DIAGNOSIS — Z88.0 ALLERGY STATUS TO PENICILLIN: ICD-10-CM

## 2022-06-10 DIAGNOSIS — E78.5 HYPERLIPIDEMIA, UNSPECIFIED: ICD-10-CM

## 2022-06-10 DIAGNOSIS — Z79.82 LONG TERM (CURRENT) USE OF ASPIRIN: ICD-10-CM

## 2022-06-10 DIAGNOSIS — M17.11 UNILATERAL PRIMARY OSTEOARTHRITIS, RIGHT KNEE: ICD-10-CM

## 2022-06-10 DIAGNOSIS — K21.9 GASTRO-ESOPHAGEAL REFLUX DISEASE WITHOUT ESOPHAGITIS: ICD-10-CM

## 2022-06-10 DIAGNOSIS — M25.761 OSTEOPHYTE, RIGHT KNEE: ICD-10-CM

## 2022-06-10 DIAGNOSIS — E66.9 OBESITY, UNSPECIFIED: ICD-10-CM

## 2022-06-29 ENCOUNTER — APPOINTMENT (OUTPATIENT)
Dept: ORTHOPEDIC SURGERY | Facility: CLINIC | Age: 67
End: 2022-06-29
Payer: MEDICARE

## 2022-06-29 VITALS — DIASTOLIC BLOOD PRESSURE: 80 MMHG | SYSTOLIC BLOOD PRESSURE: 132 MMHG

## 2022-06-29 PROBLEM — E78.5 HYPERLIPIDEMIA, UNSPECIFIED: Chronic | Status: ACTIVE | Noted: 2022-06-03

## 2022-06-29 PROBLEM — I10 ESSENTIAL (PRIMARY) HYPERTENSION: Chronic | Status: ACTIVE | Noted: 2022-06-03

## 2022-06-29 PROCEDURE — 99024 POSTOP FOLLOW-UP VISIT: CPT

## 2022-06-29 NOTE — HISTORY OF PRESENT ILLNESS
[de-identified] : First post op for the right total knee replacement, surgical date 6/6/2022 [de-identified] : 65 y/o female about 3 weeks postop from R TKA. States she takes Tylenol for the pain. Reports she has not used the oxycodone. Ambulating with a walker today. [de-identified] : Right knee:\par Well healing surgical incision benign appearing\par mild linear scabbing \par moderate effusion\par tibial incisions healed\par ROM 5- 110 [de-identified] : 65 y/o female 3 weeks s/p R TKA [de-identified] : - Tibial sutures removed\par - Begin OPT\par - Wean assistive devices as tolerated \par - RTC in 4 weeks with R knee XRs

## 2022-07-27 ENCOUNTER — RESULT REVIEW (OUTPATIENT)
Age: 67
End: 2022-07-27

## 2022-07-27 ENCOUNTER — APPOINTMENT (OUTPATIENT)
Dept: ORTHOPEDIC SURGERY | Facility: CLINIC | Age: 67
End: 2022-07-27

## 2022-07-27 ENCOUNTER — OUTPATIENT (OUTPATIENT)
Dept: OUTPATIENT SERVICES | Facility: HOSPITAL | Age: 67
LOS: 1 days | End: 2022-07-27
Payer: MEDICARE

## 2022-07-27 VITALS — SYSTOLIC BLOOD PRESSURE: 134 MMHG | DIASTOLIC BLOOD PRESSURE: 79 MMHG

## 2022-07-27 DIAGNOSIS — Z98.890 OTHER SPECIFIED POSTPROCEDURAL STATES: Chronic | ICD-10-CM

## 2022-07-27 PROCEDURE — 73564 X-RAY EXAM KNEE 4 OR MORE: CPT

## 2022-07-27 PROCEDURE — 73564 X-RAY EXAM KNEE 4 OR MORE: CPT | Mod: 26,RT

## 2022-07-27 PROCEDURE — 99024 POSTOP FOLLOW-UP VISIT: CPT

## 2022-07-27 RX ORDER — MORPHINE SULFATE 15 MG/1
15 TABLET, FILM COATED, EXTENDED RELEASE ORAL
Qty: 28 | Refills: 0 | Status: DISCONTINUED | COMMUNITY
Start: 2022-06-14 | End: 2022-07-27

## 2022-07-27 RX ORDER — OXYCODONE 5 MG/1
5 TABLET ORAL
Qty: 50 | Refills: 0 | Status: DISCONTINUED | COMMUNITY
Start: 2022-06-06 | End: 2022-07-27

## 2022-07-27 RX ORDER — PANTOPRAZOLE 40 MG/1
40 TABLET, DELAYED RELEASE ORAL DAILY
Qty: 30 | Refills: 2 | Status: DISCONTINUED | COMMUNITY
Start: 2022-06-06 | End: 2022-07-27

## 2022-07-27 RX ORDER — ASPIRIN 81 MG/1
81 TABLET ORAL
Qty: 60 | Refills: 0 | Status: DISCONTINUED | COMMUNITY
Start: 2022-06-06 | End: 2022-07-27

## 2022-07-27 RX ORDER — TRAMADOL HYDROCHLORIDE 50 MG/1
50 TABLET, COATED ORAL
Qty: 60 | Refills: 0 | Status: DISCONTINUED | COMMUNITY
Start: 2022-06-14 | End: 2022-07-27

## 2022-07-28 NOTE — HISTORY OF PRESENT ILLNESS
[de-identified] : s/p right total knee replacement, surgical date 6/6/2022.  [de-identified] : 65 y/o female presents about 7 weeks s/p R TKA.  Patient has been participating in PT. She reports mainly nocturnal pain and takes Tylenol as needed. [de-identified] : right knee:\par well healed surgical incision, benign appearing\par ROM: 3-100\par Ligaments; stable\par Ambulates with a walker  [de-identified] : R knee XRs taken today demonstrate stable position of the TKA without evidence of mechanical complication. Patella sits at appropriate height and tracks centrally.  [de-identified] : 67y/o female about 7wk s/p R TKA [de-identified] : - Discussed continuing to  improve ROM \par - Cont PT/HEP\par - RTC in 2 months with right knee XRs

## 2022-08-23 ENCOUNTER — RESULT REVIEW (OUTPATIENT)
Age: 67
End: 2022-08-23

## 2022-08-23 ENCOUNTER — OUTPATIENT (OUTPATIENT)
Dept: OUTPATIENT SERVICES | Facility: HOSPITAL | Age: 67
LOS: 1 days | End: 2022-08-23
Payer: MEDICARE

## 2022-08-23 ENCOUNTER — APPOINTMENT (OUTPATIENT)
Dept: ORTHOPEDIC SURGERY | Facility: CLINIC | Age: 67
End: 2022-08-23

## 2022-08-23 VITALS
HEART RATE: 74 BPM | OXYGEN SATURATION: 99 % | HEIGHT: 64 IN | SYSTOLIC BLOOD PRESSURE: 192 MMHG | WEIGHT: 170 LBS | BODY MASS INDEX: 29.02 KG/M2 | DIASTOLIC BLOOD PRESSURE: 81 MMHG

## 2022-08-23 DIAGNOSIS — Z98.890 OTHER SPECIFIED POSTPROCEDURAL STATES: Chronic | ICD-10-CM

## 2022-08-23 DIAGNOSIS — M54.16 RADICULOPATHY, LUMBAR REGION: ICD-10-CM

## 2022-08-23 PROCEDURE — 99024 POSTOP FOLLOW-UP VISIT: CPT

## 2022-08-23 PROCEDURE — 73564 X-RAY EXAM KNEE 4 OR MORE: CPT

## 2022-08-23 PROCEDURE — 73564 X-RAY EXAM KNEE 4 OR MORE: CPT | Mod: 26,RT

## 2022-08-23 NOTE — HISTORY OF PRESENT ILLNESS
[de-identified] : s/p right total knee replacement  , surgical date 06/06/2022 [de-identified] : 65 y/o female about 11 weeks s/p R TKA doing well. States she is currently participating in PT. Takes Advil as needed for pain. Ambulates with a cane today. She reports a right sided pain originating in her lower back that radiates down the leg. This is her main pain source now.  [de-identified] : Right knee:\par well healed surgical incision benign appearing\par ROM 3-115\par Ligaments stable\par \par Ambulating with cane, limping on the right [de-identified] : Right knee XRs taken today demonstrate stable position of the components without evidence of mechanical complication. Patella sits at appropriate height and tracks centrally. [de-identified] : 67y/o female nearly 3mo s/p R TKA, with right lumbar radiculopathy [de-identified] : - Cont PT/ HEP\par - Tylenol as needed for pain\par - Lumbar spine MRI  no cont\par - Pain management referral\par - RTC in 1 year with bilateral knee XRs

## 2022-10-05 ENCOUNTER — APPOINTMENT (OUTPATIENT)
Dept: ORTHOPEDIC SURGERY | Facility: CLINIC | Age: 67
End: 2022-10-05

## 2023-06-13 ENCOUNTER — APPOINTMENT (OUTPATIENT)
Dept: ORTHOPEDIC SURGERY | Facility: CLINIC | Age: 68
End: 2023-06-13
Payer: MEDICARE

## 2023-06-13 ENCOUNTER — RESULT REVIEW (OUTPATIENT)
Age: 68
End: 2023-06-13

## 2023-06-13 ENCOUNTER — OUTPATIENT (OUTPATIENT)
Dept: OUTPATIENT SERVICES | Facility: HOSPITAL | Age: 68
LOS: 1 days | End: 2023-06-13
Payer: MEDICARE

## 2023-06-13 VITALS
WEIGHT: 184 LBS | SYSTOLIC BLOOD PRESSURE: 158 MMHG | HEIGHT: 64 IN | OXYGEN SATURATION: 98 % | DIASTOLIC BLOOD PRESSURE: 94 MMHG | HEART RATE: 73 BPM | BODY MASS INDEX: 31.41 KG/M2

## 2023-06-13 DIAGNOSIS — Z98.890 OTHER SPECIFIED POSTPROCEDURAL STATES: Chronic | ICD-10-CM

## 2023-06-13 PROCEDURE — 20610 DRAIN/INJ JOINT/BURSA W/O US: CPT

## 2023-06-13 PROCEDURE — 99214 OFFICE O/P EST MOD 30 MIN: CPT | Mod: 25

## 2023-06-13 PROCEDURE — 73564 X-RAY EXAM KNEE 4 OR MORE: CPT

## 2023-06-13 PROCEDURE — 73564 X-RAY EXAM KNEE 4 OR MORE: CPT | Mod: 26,50

## 2023-06-14 NOTE — DISCUSSION/SUMMARY
[de-identified] : 68 y/o female now approximately 1 year s/p right TKA with minimal arthrofibrosis, otherwise doing well, with left knee OA and right ankle pain. \par - The right knee appears to be doing well and I do not think there is any thing to be added for it at this time. The left knee is becoming increasingly symptomatic so we will reinitiate a nonsurgical treatment course including PT, HEP, Tylenol, and Motrin as needed as well as a left knee CSI today. \par - We will also refer her to foot and ankle to discuss her right ankle complaints. \par - F/u in 3 months with no new XR needed for further clinical reevaluation, particularly of the left knee. If the left knee continues to be highly symptomatic in the future despite the above measures, then we may have to have a discussion regarding TKA in the future.

## 2023-06-14 NOTE — HISTORY OF PRESENT ILLNESS
[de-identified] : R TKA 6/6/22\par \par 06/13/2023: 68 y/o female f/u approximately 1 year s/p right TKA. She reports that the right knee is still somewhat painful when she hyperflexes the knee. Her chief complaint today is her right ankle which has been very painful for the past 2 weeks. She denies injury or fall but has been using a cane and Motrin due to severe pain. She states that her left knee lona and is painful consistently. She notes that the left knee is generally nonpainful but she has sudden unpredictable episodes of sharp pain that are associated with buckling.  This occurs on a daily basis.  She denies having any falls associated with these episodes. \par \par 2/23/22: 65y/o female, Wolof speaking, accompanied by grandson for evaluation of bilateral knee pain and stiffness. Symptoms progressive for the past 4-5 years. Treatments thus far have included PT, Tylenol, various OTC NSAIDs, multiple corticosteroid injections, and bilateral knee arthroscopies (right 2017, left 2018). Complains of ongoing mostly medial pain with any activity. Ambulatory tolerance less than a block but remains able to perform ADLs with pain. Here to discuss TKA.\par \par PMH sig for HTN.\par \par Reports itchiness upon administration of penicillin without anaphylactoid reactions.

## 2023-06-14 NOTE — ADDENDUM
[FreeTextEntry1] : Documented by Amarilys Wells acting as a scribe for Dr. Diogo Olvera on 06/13/2023.

## 2023-06-14 NOTE — END OF VISIT
[FreeTextEntry3] : All medical record entries made by the Scribe were at my, Dr. Diogo Olvera, direction and personally dictated by me on 06/13/2023. I have reviewed the chart and agree that the record accurately reflects my personal performance of the history, physical exam, assessment and plan. I have also personally directed, reviewed, and agreed with the chart.

## 2023-06-14 NOTE — PROCEDURE
[de-identified] : Procedure: Knee joint injection\par Laterality: left \par Indication: Osteoarthritis - discussed with patient\par Skin prep: alcohol and chlorhexidine\par Anesthesia: ethyl chloride spray\par Needle: 20-gauge\par Portal: inferolateral\par Aspiration: none\par Injectate: 2cc of 2% lidocaine, 2cc of 0.5% bupivacaine, and 1cc of 40mg/mL triamcinolone\par Dressing: Band-aid\par Complications: None

## 2023-06-14 NOTE — PHYSICAL EXAM
[de-identified] :  General appearance: well nourished and hydrated, pleasant, alert and oriented x 3, cooperative.  \par HEENT: normocephalic, EOM intact, wearing mask, external auditory canal clear.  \par Cardiovascular: no lower leg edema, no varicosities, dorsalis pedis pulses palpable and symmetric.  \par Lymphatics: no palpable lymphadenopathy, no lymphedema.  \par Neurologic: sensation is normal, no muscle weakness in upper or lower extremities, patella tendon reflexes present and symmetric.  \par Dermatologic: skin moist, warm, no rash.  \par Spine: cervical spine with normal lordosis and painless range of motion, thoracic spine with normal kyphosis and painless range of motion, lumbosacral spine with normal lordosis and painless range of motion.  No tenderness to palpation along midline spine and paraspinal musculature.  Sacroiliac joints nontender bilaterally. Negative SLR and crossed SLR tests bilaterally.\par Gait: presents with a cane and is able to demonstrate stable gait without it with marked caution and mild right sided antalgia\par \par Left knee: \par - Focal soft tissue swelling: none\par - Ecchymosis: none\par - Erythema: none\par - Effusion: trace, no Baker's cyst\par - Wounds: well healed arthroscopic portals, benign appearing\par - Alignment: normal\par - Tenderness: none\par - ROM: 3-115\par - Collateral laxity: none\par - Cruciate laxity: none\par - Popliteal angle (degrees): 15\par - Quad strength: 5/5\par \par Right knee:\par - Focal soft tissue swelling: none\par - Ecchymosis: none\par - Erythema: none\par - Effusion: small, no Baker's cyst\par - Wounds: well healed midline incision, benign appearing \par - Alignment: normal\par - Tenderness: mild diffuse circumferential TTP\par - ROM: 3-115\par - Collateral laxity: none\par - Cruciate laxity: none\par - Popliteal angle (degrees): 15\par - Quad strength: 5/5 [de-identified] : 4 radiographic views were taken of b/l knees. These demonstrate for the right knee stable position of her right TKA as compared to previous imaging without evidence of mechanical complication. Patella sits at normal height and tracks centrally. Moderate joint effusion is present. Left knee demonstrates normal alignment, tricompartmental OA most pronounced medially, KL 3. Patella sits at normal height and tracks centrally.

## 2023-06-19 ENCOUNTER — OUTPATIENT (OUTPATIENT)
Dept: OUTPATIENT SERVICES | Facility: HOSPITAL | Age: 68
LOS: 1 days | End: 2023-06-19
Payer: MEDICARE

## 2023-06-19 ENCOUNTER — RESULT REVIEW (OUTPATIENT)
Age: 68
End: 2023-06-19

## 2023-06-19 ENCOUNTER — APPOINTMENT (OUTPATIENT)
Dept: RADIOLOGY | Facility: CLINIC | Age: 68
End: 2023-06-19

## 2023-06-19 ENCOUNTER — APPOINTMENT (OUTPATIENT)
Dept: ORTHOPEDIC SURGERY | Facility: CLINIC | Age: 68
End: 2023-06-19
Payer: MEDICARE

## 2023-06-19 VITALS
OXYGEN SATURATION: 98 % | BODY MASS INDEX: 30.73 KG/M2 | HEART RATE: 67 BPM | SYSTOLIC BLOOD PRESSURE: 158 MMHG | HEIGHT: 64 IN | DIASTOLIC BLOOD PRESSURE: 104 MMHG | WEIGHT: 180 LBS | RESPIRATION RATE: 18 BRPM

## 2023-06-19 DIAGNOSIS — M25.571 PAIN IN RIGHT ANKLE AND JOINTS OF RIGHT FOOT: ICD-10-CM

## 2023-06-19 PROCEDURE — 73610 X-RAY EXAM OF ANKLE: CPT | Mod: 26,RT

## 2023-06-19 PROCEDURE — 99203 OFFICE O/P NEW LOW 30 MIN: CPT

## 2023-07-10 ENCOUNTER — APPOINTMENT (OUTPATIENT)
Dept: ORTHOPEDIC SURGERY | Facility: CLINIC | Age: 68
End: 2023-07-10

## 2023-11-10 ENCOUNTER — APPOINTMENT (OUTPATIENT)
Dept: ORTHOPEDIC SURGERY | Facility: CLINIC | Age: 68
End: 2023-11-10
Payer: MEDICARE

## 2023-11-10 VITALS
BODY MASS INDEX: 30.73 KG/M2 | HEIGHT: 64 IN | SYSTOLIC BLOOD PRESSURE: 166 MMHG | DIASTOLIC BLOOD PRESSURE: 85 MMHG | HEART RATE: 71 BPM | OXYGEN SATURATION: 100 % | WEIGHT: 180 LBS

## 2023-11-10 DIAGNOSIS — Z47.1 AFTERCARE FOLLOWING JOINT REPLACEMENT SURGERY: ICD-10-CM

## 2023-11-10 DIAGNOSIS — Z96.651 AFTERCARE FOLLOWING JOINT REPLACEMENT SURGERY: ICD-10-CM

## 2023-11-10 DIAGNOSIS — M17.12 UNILATERAL PRIMARY OSTEOARTHRITIS, LEFT KNEE: ICD-10-CM

## 2023-11-10 DIAGNOSIS — M17.0 BILATERAL PRIMARY OSTEOARTHRITIS OF KNEE: ICD-10-CM

## 2023-11-10 PROCEDURE — 99214 OFFICE O/P EST MOD 30 MIN: CPT

## 2024-01-08 ENCOUNTER — APPOINTMENT (OUTPATIENT)
Dept: ORTHOPEDIC SURGERY | Facility: CLINIC | Age: 69
End: 2024-01-08

## 2024-01-08 VITALS
DIASTOLIC BLOOD PRESSURE: 92 MMHG | WEIGHT: 175 LBS | SYSTOLIC BLOOD PRESSURE: 156 MMHG | OXYGEN SATURATION: 97 % | HEIGHT: 64 IN | BODY MASS INDEX: 29.88 KG/M2 | HEART RATE: 81 BPM

## 2024-01-12 ENCOUNTER — NON-APPOINTMENT (OUTPATIENT)
Age: 69
End: 2024-01-12

## 2024-01-12 ENCOUNTER — APPOINTMENT (OUTPATIENT)
Dept: INTERNAL MEDICINE | Facility: CLINIC | Age: 69
End: 2024-01-12
Payer: MEDICARE

## 2024-01-12 VITALS
OXYGEN SATURATION: 96 % | RESPIRATION RATE: 17 BRPM | SYSTOLIC BLOOD PRESSURE: 155 MMHG | BODY MASS INDEX: 29.88 KG/M2 | TEMPERATURE: 98.1 F | HEIGHT: 64 IN | HEART RATE: 80 BPM | DIASTOLIC BLOOD PRESSURE: 79 MMHG | WEIGHT: 175 LBS

## 2024-01-12 DIAGNOSIS — Z87.898 PERSONAL HISTORY OF OTHER SPECIFIED CONDITIONS: ICD-10-CM

## 2024-01-12 DIAGNOSIS — E66.9 OBESITY, UNSPECIFIED: ICD-10-CM

## 2024-01-12 PROCEDURE — 99203 OFFICE O/P NEW LOW 30 MIN: CPT | Mod: 25

## 2024-01-12 PROCEDURE — 93000 ELECTROCARDIOGRAM COMPLETE: CPT | Mod: 59

## 2024-01-12 PROCEDURE — 36415 COLL VENOUS BLD VENIPUNCTURE: CPT

## 2024-01-12 RX ORDER — CELECOXIB 200 MG/1
200 CAPSULE ORAL TWICE DAILY
Qty: 60 | Refills: 2 | Status: DISCONTINUED | COMMUNITY
Start: 2022-06-06 | End: 2024-01-12

## 2024-01-12 RX ORDER — ACETAMINOPHEN 500 MG/1
500 TABLET ORAL
Qty: 180 | Refills: 2 | Status: DISCONTINUED | COMMUNITY
Start: 2022-06-06 | End: 2024-01-12

## 2024-01-12 RX ORDER — CELECOXIB 200 MG/1
200 CAPSULE ORAL TWICE DAILY
Qty: 60 | Refills: 2 | Status: DISCONTINUED | COMMUNITY
Start: 2022-02-23 | End: 2024-01-12

## 2024-01-12 RX ORDER — MELOXICAM 7.5 MG/1
7.5 TABLET ORAL
Qty: 30 | Refills: 0 | Status: DISCONTINUED | COMMUNITY
Start: 2023-06-19 | End: 2024-01-12

## 2024-01-12 RX ORDER — METOPROLOL SUCCINATE 25 MG/1
25 TABLET, EXTENDED RELEASE ORAL
Qty: 90 | Refills: 0 | Status: DISCONTINUED | COMMUNITY
Start: 2021-07-21 | End: 2024-01-12

## 2024-01-12 NOTE — PHYSICAL EXAM
[No Acute Distress] : no acute distress [Well-Appearing] : well-appearing [Normal Sclera/Conjunctiva] : normal sclera/conjunctiva [Normal Outer Ear/Nose] : the outer ears and nose were normal in appearance [No Edema] : there was no peripheral edema [Normal] : affect was normal and insight and judgment were intact

## 2024-01-15 LAB
ANION GAP SERPL CALC-SCNC: 13 MMOL/L
BASOPHILS # BLD AUTO: 0.02 K/UL
BASOPHILS NFR BLD AUTO: 0.6 %
BUN SERPL-MCNC: 19 MG/DL
CALCIUM SERPL-MCNC: 9.3 MG/DL
CHLORIDE SERPL-SCNC: 103 MMOL/L
CO2 SERPL-SCNC: 24 MMOL/L
CREAT SERPL-MCNC: 1.07 MG/DL
EGFR: 57 ML/MIN/1.73M2
EOSINOPHIL # BLD AUTO: 0.05 K/UL
EOSINOPHIL NFR BLD AUTO: 1.5 %
ESTIMATED AVERAGE GLUCOSE: 111 MG/DL
GLUCOSE SERPL-MCNC: 105 MG/DL
HBA1C MFR BLD HPLC: 5.5 %
HCT VFR BLD CALC: 39.9 %
HGB BLD-MCNC: 13.2 G/DL
IMM GRANULOCYTES NFR BLD AUTO: 0.3 %
LYMPHOCYTES # BLD AUTO: 1.42 K/UL
LYMPHOCYTES NFR BLD AUTO: 41.6 %
MAN DIFF?: NORMAL
MCHC RBC-ENTMCNC: 30.1 PG
MCHC RBC-ENTMCNC: 33.1 GM/DL
MCV RBC AUTO: 91.1 FL
MONOCYTES # BLD AUTO: 0.5 K/UL
MONOCYTES NFR BLD AUTO: 14.7 %
MRSA SPEC QL CULT: NOT DETECTED
NEUTROPHILS # BLD AUTO: 1.41 K/UL
NEUTROPHILS NFR BLD AUTO: 41.3 %
PLATELET # BLD AUTO: 238 K/UL
POTASSIUM SERPL-SCNC: 4.2 MMOL/L
RBC # BLD: 4.38 M/UL
RBC # FLD: 12.1 %
SODIUM SERPL-SCNC: 140 MMOL/L
STAPH AUREUS (SA): DETECTED
WBC # FLD AUTO: 3.41 K/UL

## 2024-01-15 NOTE — ASSESSMENT
[High Risk Surgery - Intraperitoneal, Intrathoracic or Supringuinal Vascular Procedures] : High Risk Surgery - Intraperitoneal, Intrathoracic or Supringuinal Vascular Procedures - No (0) [Ischemic Heart Disease] : Ischemic Heart Disease - No (0) [Congestive Heart Failure] : Congestive Heart Failure - No (0) [Prior Cerebrovascular Accident or TIA] : Prior Cerebrovascular Accident or TIA - No (0) [Creatinine >= 2mg/dL (1 Point)] : Creatinine >= 2mg/dL - No (0) [Insulin-dependent Diabetic (1 Point)] : Insulin-dependent Diabetic - No (0) [0] : 0 , RCRI Class: I, Risk of Post-Op Cardiac Complications: 3.9%, 95% CI for Risk Estimate: 2.8% - 5.4% [Cardiology consultation] : Cardiology consultation [Continue medications as is] : Continue current medications [FreeTextEntry4] : Ms. VALENTINA EMMANUEL is a 68-year-old primary Slovenian speaking female with history of CKD, OA, HTN, and HLD, presenting today to the [facility] prior to knee replacement surgery. Given elevated risk procedure, poor functional status and CAD risk factors, she will need cardiology consultation prior to surgery. labs drawn in office today.

## 2024-01-15 NOTE — HISTORY OF PRESENT ILLNESS
[No Pertinent Cardiac History] : no history of aortic stenosis, atrial fibrillation, coronary artery disease, recent myocardial infarction, or implantable device/pacemaker [No Pertinent Pulmonary History] : no history of asthma, COPD, sleep apnea, or smoking [(Patient denies any chest pain, claudication, dyspnea on exertion, orthopnea, palpitations or syncope)] : Patient denies any chest pain, claudication, dyspnea on exertion, orthopnea, palpitations or syncope [Poor (<4 METs)] : Poor (<4 METs) [Self] : no previous adverse anesthesia reaction [Chronic Anticoagulation] : no chronic anticoagulation [Chronic Kidney Disease] : chronic kidney disease [Diabetes] : no diabetes [FreeTextEntry1] : LT JOVITA MONTGOMERY [FreeTextEntry2] : 1/22/24 [FreeTextEntry3] : Dr. Olvera [FreeTextEntry4] : Ms. VALENTINA EMMANUEL is a 68-year-old primary Mongolian speaking female with history of CKD, OA, HTN, and HLD, presenting today to the [facility] prior to knee replacement surgery. She reports history of palpitations ~4 years ago resulting inpatient admission where she started on Toprol. Her cardiologist subsequently took her off the medication and started her on amlodipine. EST at the time she reports was normal. Since then no cardiac symptoms.  [FreeTextEntry8] : able to walk 2 blocks before stopping, goes up stairs 2 steps at a time due to knee pain

## 2024-01-15 NOTE — ADDENDUM
[FreeTextEntry1] : Labs reviewed: Stable renal function, +MSSA screen. No contraindications to surgery.

## 2024-01-15 NOTE — INTERPRETER SERVICES
[[employer] ] : provided by [employer]   [Interpreters_FullName] : Wilfredo [[id number]] : Egyptian

## 2024-01-16 ENCOUNTER — NON-APPOINTMENT (OUTPATIENT)
Age: 69
End: 2024-01-16

## 2024-01-16 ENCOUNTER — APPOINTMENT (OUTPATIENT)
Dept: HEART AND VASCULAR | Facility: CLINIC | Age: 69
End: 2024-01-16
Payer: MEDICARE

## 2024-01-16 VITALS
HEART RATE: 69 BPM | SYSTOLIC BLOOD PRESSURE: 175 MMHG | OXYGEN SATURATION: 98 % | DIASTOLIC BLOOD PRESSURE: 99 MMHG | WEIGHT: 180 LBS | HEIGHT: 64 IN | BODY MASS INDEX: 30.73 KG/M2

## 2024-01-16 DIAGNOSIS — I10 ESSENTIAL (PRIMARY) HYPERTENSION: ICD-10-CM

## 2024-01-16 DIAGNOSIS — Z01.818 ENCOUNTER FOR OTHER PREPROCEDURAL EXAMINATION: ICD-10-CM

## 2024-01-16 DIAGNOSIS — E78.5 HYPERLIPIDEMIA, UNSPECIFIED: ICD-10-CM

## 2024-01-16 PROCEDURE — 93000 ELECTROCARDIOGRAM COMPLETE: CPT

## 2024-01-16 PROCEDURE — 99203 OFFICE O/P NEW LOW 30 MIN: CPT

## 2024-01-16 RX ORDER — ATORVASTATIN CALCIUM 40 MG/1
40 TABLET, FILM COATED ORAL
Qty: 90 | Refills: 3 | Status: ACTIVE | COMMUNITY
Start: 1900-01-01 | End: 1900-01-01

## 2024-01-16 RX ORDER — AMLODIPINE BESYLATE 5 MG/1
TABLET ORAL
Refills: 0 | Status: DISCONTINUED | COMMUNITY
End: 2024-01-16

## 2024-01-16 RX ORDER — AMLODIPINE BESYLATE 10 MG/1
10 TABLET ORAL DAILY
Qty: 90 | Refills: 3 | Status: ACTIVE | COMMUNITY
Start: 1900-01-01 | End: 1900-01-01

## 2024-01-16 RX ORDER — HYDROCHLOROTHIAZIDE 12.5 MG/1
12.5 TABLET ORAL DAILY
Qty: 90 | Refills: 2 | Status: ACTIVE | COMMUNITY
Start: 2024-01-16 | End: 1900-01-01

## 2024-01-16 RX ORDER — ROSUVASTATIN CALCIUM 5 MG/1
TABLET, FILM COATED ORAL
Refills: 0 | Status: DISCONTINUED | COMMUNITY
End: 2024-01-16

## 2024-01-16 NOTE — HISTORY OF PRESENT ILLNESS
[FreeTextEntry1] : 69YO F with HTN, CKD, OA, presents for pre operative evaluation. Patient is scheduled to undergo R TKA at [facility] in Feb 2024 with Dr. Olvera. She states that she has had long standing history of hypertension and has been on amlodipine 5mg QD. She denies any chest pain or dyspnea. She is able to walk about 2 blocks before stopping because of her knee pain. She reports having some palpitations around 4 years ago and at that time underwent a stress test which was normal. Denies recurrence of symptoms since then.

## 2024-01-16 NOTE — DISCUSSION/SUMMARY
[FreeTextEntry1] : #Pre operative evaluation Plan for R TKA Feb 2024 EKG: NSR METS >4 (able to walk 2 blocks, limited by knee pain) RCRI 0  - Patient is intermediate risk for an intermediate risk procedure. She needs blood pressure optimization however that should not preclude her from undergoing this upcoming surgery.   - Management of hypertension as below  #HTN Patient's /99, asymptomatic  - Will increase home amlodipine from 5mg QD to 10mg QD  - Will add HCTZ 12.5mg QD  - Follow up blood pressure after surgery  RTC after surgery  [EKG obtained to assist in diagnosis and management of assessed problem(s)] : EKG obtained to assist in diagnosis and management of assessed problem(s)

## 2024-01-18 NOTE — H&P ADULT - PROBLEM SELECTOR PLAN 1
Admit to Orthopedic Service for elective left total knee replacement    Medically cleared and optimized for surgery by Dr. Truong and Dr. Angulo

## 2024-01-18 NOTE — H&P ADULT - HISTORY OF PRESENT ILLNESS
Indian speaking patient    68F c/o left knee pain x     H/o CKD     Presents today for a left total knee replacement.  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 Pain persists despite conservative measures including PT and injections. Pt ambulates with a cane at baseline for assistance. Denies hx DVT. Denies numbness/tingling/paresthesias BLE. H/o CKD   Presents today for a left total knee replacement.   Pt is Citizen of [place] speaking and was interviewed with [employer].

## 2024-01-18 NOTE — H&P ADULT - NSHPPHYSICALEXAM_GEN_ALL_CORE
Gen: 68F NAD  MSK: Decreased left knee ROM secondary to pain      Rest of PE per MD clearance Gen: 68F NAD  MSK: Decreased left knee ROM secondary to pain  MSK: No deformity or open wounds. No rashes or lesions. EHL/TA/GS/FHL 5/5 BLE. Sensation intact and equal BLE. Skin warm and well perfused. DP palpable BLE. Cap refill brisk. Negative chris bilaterally.   Rest of PE per MD clearance

## 2024-01-18 NOTE — H&P ADULT - NSHPLABSRESULTS_GEN_ALL_CORE
Preop CBC, BMP within normal range and reviewed per medical clearance  H/H: 13.2/ 39.9  Cr: 1.07  A1C: 5.5   Preop EKG 67bpm SR within normal limits and reviewed per medical clearance  3M: DOS  MRSA negative

## 2024-01-19 VITALS
HEART RATE: 81 BPM | TEMPERATURE: 98 F | RESPIRATION RATE: 16 BRPM | SYSTOLIC BLOOD PRESSURE: 137 MMHG | HEIGHT: 64 IN | DIASTOLIC BLOOD PRESSURE: 85 MMHG | OXYGEN SATURATION: 99 % | WEIGHT: 175.93 LBS

## 2024-01-19 RX ORDER — CHLORHEXIDINE GLUCONATE 213 G/1000ML
1 SOLUTION TOPICAL EVERY 12 HOURS
Refills: 0 | Status: COMPLETED | OUTPATIENT
Start: 2024-01-22 | End: 2024-01-23

## 2024-01-19 RX ORDER — ACETAMINOPHEN 500 MG
2 TABLET ORAL
Qty: 0 | Refills: 0 | DISCHARGE

## 2024-01-19 RX ORDER — POVIDONE-IODINE 5 %
1 AEROSOL (ML) TOPICAL ONCE
Refills: 0 | Status: COMPLETED | OUTPATIENT
Start: 2024-01-22 | End: 2024-01-22

## 2024-01-19 RX ORDER — ATORVASTATIN CALCIUM 80 MG/1
1 TABLET, FILM COATED ORAL
Qty: 0 | Refills: 0 | DISCHARGE

## 2024-01-19 RX ORDER — METOPROLOL TARTRATE 50 MG
1 TABLET ORAL
Qty: 0 | Refills: 0 | DISCHARGE

## 2024-01-19 NOTE — ASU PATIENT PROFILE, ADULT - NSICDXPASTSURGICALHX_GEN_ALL_CORE_FT
PAST SURGICAL HISTORY:  History of hysteroscopy      PAST SURGICAL HISTORY:  H/O knee surgery BILATERAL    History of hysteroscopy

## 2024-01-19 NOTE — ASU PATIENT PROFILE, ADULT - NSICDXPASTMEDICALHX_GEN_ALL_CORE_FT
PAST MEDICAL HISTORY:  Hyperlipemia     Hypertension     Osteoarthritis      PAST MEDICAL HISTORY:  H/O: obesity     Hyperlipemia     Hypertension     Lumbar radiculopathy     Osteoarthritis      PAST MEDICAL HISTORY:  H/O: obesity     Hyperlipemia     Hypertension     Lumbar radiculopathy     Osteoarthritis     Palpitation

## 2024-01-21 ENCOUNTER — TRANSCRIPTION ENCOUNTER (OUTPATIENT)
Age: 69
End: 2024-01-21

## 2024-01-21 RX ORDER — PANTOPRAZOLE 40 MG/1
40 TABLET, DELAYED RELEASE ORAL DAILY
Qty: 30 | Refills: 2 | Status: ACTIVE | COMMUNITY
Start: 2024-01-21 | End: 1900-01-01

## 2024-01-21 RX ORDER — ASPIRIN ENTERIC COATED TABLETS 81 MG 81 MG/1
81 TABLET, DELAYED RELEASE ORAL
Qty: 60 | Refills: 0 | Status: ACTIVE | COMMUNITY
Start: 2024-01-21 | End: 1900-01-01

## 2024-01-21 RX ORDER — ACETAMINOPHEN 500 MG/1
500 TABLET ORAL
Qty: 180 | Refills: 1 | Status: ACTIVE | COMMUNITY
Start: 2024-01-21 | End: 1900-01-01

## 2024-01-21 RX ORDER — OXYCODONE 5 MG/1
5 TABLET ORAL
Qty: 50 | Refills: 0 | Status: ACTIVE | COMMUNITY
Start: 2024-01-21 | End: 1900-01-01

## 2024-01-21 RX ORDER — CELECOXIB 100 MG/1
100 CAPSULE ORAL
Qty: 60 | Refills: 1 | Status: ACTIVE | COMMUNITY
Start: 2024-01-21 | End: 1900-01-01

## 2024-01-22 ENCOUNTER — APPOINTMENT (OUTPATIENT)
Dept: ORTHOPEDIC SURGERY | Facility: HOSPITAL | Age: 69
End: 2024-01-22

## 2024-01-22 ENCOUNTER — RESULT REVIEW (OUTPATIENT)
Age: 69
End: 2024-01-22

## 2024-01-22 ENCOUNTER — INPATIENT (INPATIENT)
Facility: HOSPITAL | Age: 69
LOS: 2 days | Discharge: HOME HEALTH | DRG: 470 | End: 2024-01-25
Attending: ORTHOPAEDIC SURGERY | Admitting: ORTHOPAEDIC SURGERY
Payer: MEDICARE

## 2024-01-22 ENCOUNTER — TRANSCRIPTION ENCOUNTER (OUTPATIENT)
Age: 69
End: 2024-01-22

## 2024-01-22 DIAGNOSIS — E78.5 HYPERLIPIDEMIA, UNSPECIFIED: ICD-10-CM

## 2024-01-22 DIAGNOSIS — I10 ESSENTIAL (PRIMARY) HYPERTENSION: ICD-10-CM

## 2024-01-22 DIAGNOSIS — Z98.890 OTHER SPECIFIED POSTPROCEDURAL STATES: Chronic | ICD-10-CM

## 2024-01-22 DIAGNOSIS — M17.12 UNILATERAL PRIMARY OSTEOARTHRITIS, LEFT KNEE: ICD-10-CM

## 2024-01-22 PROCEDURE — 27447 TOTAL KNEE ARTHROPLASTY: CPT | Mod: LT

## 2024-01-22 PROCEDURE — 73560 X-RAY EXAM OF KNEE 1 OR 2: CPT | Mod: 26,LT

## 2024-01-22 PROCEDURE — S2900 ROBOTIC SURGICAL SYSTEM: CPT | Mod: NC

## 2024-01-22 DEVICE — IMPLANTABLE DEVICE: Type: IMPLANTABLE DEVICE | Site: LEFT | Status: FUNCTIONAL

## 2024-01-22 DEVICE — PIN CAS FIX 3.2X150MM: Type: IMPLANTABLE DEVICE | Site: LEFT | Status: FUNCTIONAL

## 2024-01-22 DEVICE — PATELLA  ALL POLY VE 32MM: Type: IMPLANTABLE DEVICE | Site: LEFT | Status: FUNCTIONAL

## 2024-01-22 DEVICE — ZIMMER/NEXGEN SMOOTH PIN 3.2X75MM: Type: IMPLANTABLE DEVICE | Site: LEFT | Status: FUNCTIONAL

## 2024-01-22 DEVICE — CEMENT PALACOS R: Type: IMPLANTABLE DEVICE | Site: LEFT | Status: FUNCTIONAL

## 2024-01-22 DEVICE — PIN FIX CAS 3.2X80MM STR: Type: IMPLANTABLE DEVICE | Site: LEFT | Status: FUNCTIONAL

## 2024-01-22 DEVICE — CELLERATE SURGICAL POWDER RX 5GM: Type: IMPLANTABLE DEVICE | Site: LEFT | Status: FUNCTIONAL

## 2024-01-22 DEVICE — ZIMMER FEMALE HEX SCREW MAGNETIC 2.5MM X 25MM: Type: IMPLANTABLE DEVICE | Site: LEFT | Status: FUNCTIONAL

## 2024-01-22 DEVICE — ZIMMER/NEXGEN HEX HEAD SCREW 3.5MM: Type: IMPLANTABLE DEVICE | Site: LEFT | Status: FUNCTIONAL

## 2024-01-22 RX ORDER — ATORVASTATIN CALCIUM 80 MG/1
1 TABLET, FILM COATED ORAL
Refills: 0 | DISCHARGE

## 2024-01-22 RX ORDER — SENNA PLUS 8.6 MG/1
2 TABLET ORAL AT BEDTIME
Refills: 0 | Status: DISCONTINUED | OUTPATIENT
Start: 2024-01-22 | End: 2024-01-25

## 2024-01-22 RX ORDER — OXYCODONE HYDROCHLORIDE 5 MG/1
5 TABLET ORAL EVERY 4 HOURS
Refills: 0 | Status: DISCONTINUED | OUTPATIENT
Start: 2024-01-22 | End: 2024-01-22

## 2024-01-22 RX ORDER — ONDANSETRON 8 MG/1
4 TABLET, FILM COATED ORAL EVERY 6 HOURS
Refills: 0 | Status: DISCONTINUED | OUTPATIENT
Start: 2024-01-22 | End: 2024-01-25

## 2024-01-22 RX ORDER — MAGNESIUM HYDROXIDE 400 MG/1
30 TABLET, CHEWABLE ORAL DAILY
Refills: 0 | Status: DISCONTINUED | OUTPATIENT
Start: 2024-01-22 | End: 2024-01-25

## 2024-01-22 RX ORDER — AMLODIPINE BESYLATE 2.5 MG/1
1 TABLET ORAL
Refills: 0 | DISCHARGE

## 2024-01-22 RX ORDER — HYDROMORPHONE HYDROCHLORIDE 2 MG/ML
0.5 INJECTION INTRAMUSCULAR; INTRAVENOUS; SUBCUTANEOUS EVERY 4 HOURS
Refills: 0 | Status: DISCONTINUED | OUTPATIENT
Start: 2024-01-22 | End: 2024-01-25

## 2024-01-22 RX ORDER — POLYETHYLENE GLYCOL 3350 17 G/17G
17 POWDER, FOR SOLUTION ORAL AT BEDTIME
Refills: 0 | Status: DISCONTINUED | OUTPATIENT
Start: 2024-01-22 | End: 2024-01-25

## 2024-01-22 RX ORDER — ATORVASTATIN CALCIUM 80 MG/1
40 TABLET, FILM COATED ORAL AT BEDTIME
Refills: 0 | Status: DISCONTINUED | OUTPATIENT
Start: 2024-01-22 | End: 2024-01-25

## 2024-01-22 RX ORDER — OXYCODONE HYDROCHLORIDE 5 MG/1
10 TABLET ORAL EVERY 4 HOURS
Refills: 0 | Status: DISCONTINUED | OUTPATIENT
Start: 2024-01-22 | End: 2024-01-22

## 2024-01-22 RX ORDER — TRAMADOL HYDROCHLORIDE 50 MG/1
25 TABLET ORAL
Refills: 0 | Status: DISCONTINUED | OUTPATIENT
Start: 2024-01-22 | End: 2024-01-23

## 2024-01-22 RX ORDER — APREPITANT 80 MG/1
40 CAPSULE ORAL ONCE
Refills: 0 | Status: COMPLETED | OUTPATIENT
Start: 2024-01-22 | End: 2024-01-22

## 2024-01-22 RX ORDER — AMLODIPINE BESYLATE 2.5 MG/1
5 TABLET ORAL DAILY
Refills: 0 | Status: DISCONTINUED | OUTPATIENT
Start: 2024-01-22 | End: 2024-01-25

## 2024-01-22 RX ORDER — ASPIRIN/CALCIUM CARB/MAGNESIUM 324 MG
81 TABLET ORAL DAILY
Refills: 0 | Status: DISCONTINUED | OUTPATIENT
Start: 2024-01-23 | End: 2024-01-25

## 2024-01-22 RX ORDER — ACETAMINOPHEN 500 MG
650 TABLET ORAL EVERY 6 HOURS
Refills: 0 | Status: ACTIVE | OUTPATIENT
Start: 2024-01-22 | End: 2024-01-25

## 2024-01-22 RX ORDER — HYDROMORPHONE HYDROCHLORIDE 2 MG/ML
0.5 INJECTION INTRAMUSCULAR; INTRAVENOUS; SUBCUTANEOUS
Refills: 0 | Status: DISCONTINUED | OUTPATIENT
Start: 2024-01-22 | End: 2024-01-25

## 2024-01-22 RX ORDER — TRAMADOL HYDROCHLORIDE 50 MG/1
50 TABLET ORAL
Refills: 0 | Status: DISCONTINUED | OUTPATIENT
Start: 2024-01-22 | End: 2024-01-23

## 2024-01-22 RX ORDER — CEFAZOLIN SODIUM 1 G
2000 VIAL (EA) INJECTION EVERY 8 HOURS
Refills: 0 | Status: COMPLETED | OUTPATIENT
Start: 2024-01-22 | End: 2024-01-23

## 2024-01-22 RX ORDER — SODIUM CHLORIDE 9 MG/ML
1000 INJECTION, SOLUTION INTRAVENOUS
Refills: 0 | Status: DISCONTINUED | OUTPATIENT
Start: 2024-01-23 | End: 2024-01-25

## 2024-01-22 RX ORDER — CELECOXIB 200 MG/1
400 CAPSULE ORAL ONCE
Refills: 0 | Status: COMPLETED | OUTPATIENT
Start: 2024-01-22 | End: 2024-01-22

## 2024-01-22 RX ORDER — ACETAMINOPHEN 500 MG
1000 TABLET ORAL ONCE
Refills: 0 | Status: COMPLETED | OUTPATIENT
Start: 2024-01-22 | End: 2024-01-22

## 2024-01-22 RX ADMIN — Medication 650 MILLIGRAM(S): at 18:03

## 2024-01-22 RX ADMIN — TRAMADOL HYDROCHLORIDE 50 MILLIGRAM(S): 50 TABLET ORAL at 22:00

## 2024-01-22 RX ADMIN — APREPITANT 40 MILLIGRAM(S): 80 CAPSULE ORAL at 08:31

## 2024-01-22 RX ADMIN — CELECOXIB 400 MILLIGRAM(S): 200 CAPSULE ORAL at 08:31

## 2024-01-22 RX ADMIN — Medication 1000 MILLIGRAM(S): at 08:30

## 2024-01-22 RX ADMIN — Medication 100 MILLIGRAM(S): at 18:03

## 2024-01-22 RX ADMIN — ATORVASTATIN CALCIUM 40 MILLIGRAM(S): 80 TABLET, FILM COATED ORAL at 22:00

## 2024-01-22 RX ADMIN — TRAMADOL HYDROCHLORIDE 50 MILLIGRAM(S): 50 TABLET ORAL at 23:00

## 2024-01-22 RX ADMIN — Medication 1 APPLICATION(S): at 08:38

## 2024-01-22 RX ADMIN — Medication 650 MILLIGRAM(S): at 23:04

## 2024-01-22 NOTE — PHYSICAL THERAPY INITIAL EVALUATION ADULT - NSPTDISCHREC_GEN_A_CORE
Problem: Adult Mental Health  Goal: Reports decrease in thoughts of suicide  Outcome: Outcome Not Met, Continue to Monitor  Nursing Suicide Assessment Note - Inpatient    Current assessment:    Current C-SSRS score: Low Risk      Protective Factors / Reason for Living: Responsibility to children, Social supports     Maintain current plan of care.    Tells active suicidal ideations, will not disclose plan, is vague, irritable, dismissive, is able to seek out staff if feeling unsafe.  Poor eye contact.       Home PT

## 2024-01-22 NOTE — PHYSICAL THERAPY INITIAL EVALUATION ADULT - IMPAIRMENTS CONTRIBUTING TO GAIT DEVIATIONS, PT EVAL
decreased ROM/decreased strength ambulation limited due to pt reporting feeling lightheaded and unable to tolerate more ambulation/decreased ROM/decreased strength Ambulation limited due to pt reporting feeling lightheaded and unable to tolerate more steps. Pt presents with decreased weight-shifting ability on L LE which can impair balance without the use of RW/impaired balance/decreased ROM/decreased strength

## 2024-01-22 NOTE — PHYSICAL THERAPY INITIAL EVALUATION ADULT - GENERAL OBSERVATIONS, REHAB EVAL
PT IE completed. Chart reviewed. Patient received in semi-supine in NAD, +tele and +B/L SCDs. ROCKY Casanova informed of PT IE. PT IE completed. Chart reviewed. Patient received in semi-supine in NAD, +tele, +cryocuff, +knee dressing C/D/I and +B/L SCDs. ROCKY Casanova informed of PT IE.

## 2024-01-22 NOTE — PHYSICAL THERAPY INITIAL EVALUATION ADULT - ADDITIONAL COMMENTS
Pt reports of living in a co-op apartment which has elevator access. Pt also reports of having a bathtub in her bathroom. Pt has family that can help her if needed daily and after 3pm.

## 2024-01-22 NOTE — PHYSICAL THERAPY INITIAL EVALUATION ADULT - PERTINENT HX OF CURRENT PROBLEM, REHAB EVAL
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 Pain persists despite conservative measures including PT and injections. Pt ambulates with a cane at baseline for assistance. Denies hx DVT. Denies numbness/tingling/paresthesias BLE. H/o CKD   Presents today for a left total knee replacement.   Pt is [place]ian speaking and was interviewed with [employer]. 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 Pain persists despite conservative measures including PT and injections. Pt ambulates with a cane at baseline for assistance. Denies hx DVT. Denies numbness/tingling/paresthesias BLE. H/o CKD   Presents today for a left total knee replacement.

## 2024-01-23 ENCOUNTER — TRANSCRIPTION ENCOUNTER (OUTPATIENT)
Age: 69
End: 2024-01-23

## 2024-01-23 LAB
ANION GAP SERPL CALC-SCNC: 9 MMOL/L — SIGNIFICANT CHANGE UP (ref 5–17)
BUN SERPL-MCNC: 19 MG/DL — SIGNIFICANT CHANGE UP (ref 7–23)
CALCIUM SERPL-MCNC: 9 MG/DL — SIGNIFICANT CHANGE UP (ref 8.4–10.5)
CHLORIDE SERPL-SCNC: 102 MMOL/L — SIGNIFICANT CHANGE UP (ref 96–108)
CO2 SERPL-SCNC: 25 MMOL/L — SIGNIFICANT CHANGE UP (ref 22–31)
CREAT SERPL-MCNC: 1.04 MG/DL — SIGNIFICANT CHANGE UP (ref 0.5–1.3)
EGFR: 59 ML/MIN/1.73M2 — LOW
GLUCOSE SERPL-MCNC: 162 MG/DL — HIGH (ref 70–99)
HCT VFR BLD CALC: 34.7 % — SIGNIFICANT CHANGE UP (ref 34.5–45)
HGB BLD-MCNC: 11.8 G/DL — SIGNIFICANT CHANGE UP (ref 11.5–15.5)
MCHC RBC-ENTMCNC: 30.6 PG — SIGNIFICANT CHANGE UP (ref 27–34)
MCHC RBC-ENTMCNC: 34 GM/DL — SIGNIFICANT CHANGE UP (ref 32–36)
MCV RBC AUTO: 90.1 FL — SIGNIFICANT CHANGE UP (ref 80–100)
NRBC # BLD: 0 /100 WBCS — SIGNIFICANT CHANGE UP (ref 0–0)
PLATELET # BLD AUTO: 210 K/UL — SIGNIFICANT CHANGE UP (ref 150–400)
POTASSIUM SERPL-MCNC: 3.9 MMOL/L — SIGNIFICANT CHANGE UP (ref 3.5–5.3)
POTASSIUM SERPL-SCNC: 3.9 MMOL/L — SIGNIFICANT CHANGE UP (ref 3.5–5.3)
RBC # BLD: 3.85 M/UL — SIGNIFICANT CHANGE UP (ref 3.8–5.2)
RBC # FLD: 11.5 % — SIGNIFICANT CHANGE UP (ref 10.3–14.5)
SODIUM SERPL-SCNC: 136 MMOL/L — SIGNIFICANT CHANGE UP (ref 135–145)
WBC # BLD: 9.15 K/UL — SIGNIFICANT CHANGE UP (ref 3.8–10.5)
WBC # FLD AUTO: 9.15 K/UL — SIGNIFICANT CHANGE UP (ref 3.8–10.5)

## 2024-01-23 PROCEDURE — 99222 1ST HOSP IP/OBS MODERATE 55: CPT

## 2024-01-23 RX ORDER — TRAMADOL HYDROCHLORIDE 50 MG/1
50 TABLET ORAL EVERY 6 HOURS
Refills: 0 | Status: DISCONTINUED | OUTPATIENT
Start: 2024-01-23 | End: 2024-01-25

## 2024-01-23 RX ORDER — TRAMADOL HYDROCHLORIDE 50 MG/1
25 TABLET ORAL EVERY 6 HOURS
Refills: 0 | Status: DISCONTINUED | OUTPATIENT
Start: 2024-01-23 | End: 2024-01-25

## 2024-01-23 RX ADMIN — AMLODIPINE BESYLATE 5 MILLIGRAM(S): 2.5 TABLET ORAL at 05:07

## 2024-01-23 RX ADMIN — Medication 650 MILLIGRAM(S): at 06:07

## 2024-01-23 RX ADMIN — TRAMADOL HYDROCHLORIDE 50 MILLIGRAM(S): 50 TABLET ORAL at 09:28

## 2024-01-23 RX ADMIN — Medication 650 MILLIGRAM(S): at 18:35

## 2024-01-23 RX ADMIN — Medication 81 MILLIGRAM(S): at 05:07

## 2024-01-23 RX ADMIN — Medication 100 MILLIGRAM(S): at 01:28

## 2024-01-23 RX ADMIN — Medication 650 MILLIGRAM(S): at 00:04

## 2024-01-23 RX ADMIN — ATORVASTATIN CALCIUM 40 MILLIGRAM(S): 80 TABLET, FILM COATED ORAL at 21:31

## 2024-01-23 RX ADMIN — POLYETHYLENE GLYCOL 3350 17 GRAM(S): 17 POWDER, FOR SOLUTION ORAL at 21:31

## 2024-01-23 RX ADMIN — Medication 650 MILLIGRAM(S): at 05:07

## 2024-01-23 RX ADMIN — TRAMADOL HYDROCHLORIDE 50 MILLIGRAM(S): 50 TABLET ORAL at 12:03

## 2024-01-23 RX ADMIN — Medication 650 MILLIGRAM(S): at 23:45

## 2024-01-23 RX ADMIN — TRAMADOL HYDROCHLORIDE 50 MILLIGRAM(S): 50 TABLET ORAL at 13:03

## 2024-01-23 RX ADMIN — Medication 650 MILLIGRAM(S): at 12:03

## 2024-01-23 RX ADMIN — TRAMADOL HYDROCHLORIDE 50 MILLIGRAM(S): 50 TABLET ORAL at 18:35

## 2024-01-23 RX ADMIN — Medication 81 MILLIGRAM(S): at 18:34

## 2024-01-23 RX ADMIN — TRAMADOL HYDROCHLORIDE 50 MILLIGRAM(S): 50 TABLET ORAL at 10:28

## 2024-01-23 RX ADMIN — SENNA PLUS 2 TABLET(S): 8.6 TABLET ORAL at 21:32

## 2024-01-23 NOTE — CONSULT NOTE ADULT - ASSESSMENT
68F with PMH of HTN and HLD, presenting for elective L total knee replacement. Now POD 1.  Cleared by outpatient providers.     #Post-operative state  #S/p L total knee replacement  - plan per primary team  - recommend bowel regimen while on narcotics  - appreciate input from physical therapy    #HTN  #HLD  - can resume home medications    Lab data, vital signs and chart reviewed.     Moderate level of medical decision making required for this case, including the presence of two chronic medical issues and discussion of plan with the orthopedic team.

## 2024-01-23 NOTE — CONSULT NOTE ADULT - SUBJECTIVE AND OBJECTIVE BOX
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 Pain persists despite conservative measures including PT and injections. Pt ambulates with a cane at baseline for assistance. Denies hx DVT. Denies numbness/tingling/paresthesias BLE. H/o CKD   Presents today for a left total knee replacement.   Pt is [place]ian speaking and was interviewed with [employer].       Review of Systems:  Review of Systems: musculoskeletal: +left knee pain  Other Review of Systems: All other review of systems negative, except as noted in HPI      Allergies and Intolerances:        Allergies:  	penicillin: Drug, Pruritus    Home Medications:   * Patient Currently Takes Medications as of 07-Jun-2022 15:06 documented in Structured Notes  · 	celecoxib 200 mg oral capsule: 1 cap(s) orally every 12 hours  · 	aspirin 81 mg oral delayed release tablet: 1 tab(s) orally 2 times a day  · 	pantoprazole 40 mg oral delayed release tablet: 1 tab(s) orally once a day (before a meal)  · 	senna oral tablet: 2 tab(s) orally once a day (at bedtime)  · 	oxyCODONE 5 mg oral tablet: 1-2 tab(s) orally every 6 hours, As needed, for moderate to severe pain   · 	atorvastatin 40 mg oral tablet: 1 tab(s) orally once a day  · 	Tylenol 500 mg oral tablet: 2 tab(s) orally every 8 hours  · 	Toprol-XL 25 mg oral tablet, extended release: 1 tab(s) orally once a day    Patient History:   Past Medical, Past Surgical, and Family History:  PAST MEDICAL HISTORY:  Hyperlipemia     Hypertension.     PAST SURGICAL HISTORY:  History of hysteroscopy.    Social History:  · Substance use	No     Tobacco Screening:  · Core Measure Site	No  "    Vital Signs Last 24 Hrs  T(C): 36.7 (23 Jan 2024 08:35), Max: 36.9 (23 Jan 2024 05:05)  T(F): 98.1 (23 Jan 2024 08:35), Max: 98.4 (23 Jan 2024 05:05)  HR: 73 (23 Jan 2024 08:35) (73 - 93)  BP: 137/82 (23 Jan 2024 11:00) (128/74 - 160/72)  BP(mean): 92 (23 Jan 2024 08:35) (86 - 104)  RR: 16 (23 Jan 2024 08:35) (15 - 24)  SpO2: 96% (23 Jan 2024 08:35) (95% - 100%)    Parameters below as of 23 Jan 2024 08:35  Patient On (Oxygen Delivery Method): room air    Physical exam  General: no acute distress, sitting up in bed  HEENT: ncat, mmm  cards: rrr, normal S1S2, no mrg  pulm: ctab, no wheeze, crackles, rales or rhonchi  ab: soft, nontender, nondistended normoactive bowel sounds  ext: no edema, wwp  neuro: speech is fluent, no facial asymmetry, follows commands  skin: warm and dry, no obvious rashes or lesions present

## 2024-01-23 NOTE — PATIENT PROFILE ADULT - FALL HARM RISK - HARM RISK INTERVENTIONS

## 2024-01-23 NOTE — PATIENT PROFILE ADULT - FUNCTIONAL ASSESSMENT - BASIC MOBILITY 3.
[FreeTextEntry1] : 1. SLE: Onset of SLE, which included polyarthritis and rash, in 2004. She improved with steroids, and was treated  by a rheumatologist in [place]. She was subsequently followed by MONICA Rodriguez [phone] until his shelter. She had under his watch arthritis and skin disease that required a short course of steroids. Since that time, her SLE has been quiet, although she has noticed more hair loss with hair washing while off hydroxychloroquine. Hydroxychloroquine was stopped because of retinal abnormalities (specifics unknown). Lab tests in 2021 were notable for normal DNA, C', and creatinine. No arthritis, thrombosis, serositis.   2. Osteoporosis: She has a history of osteoporosis treated intermittently with Boniva. No history of fracture. A DEXA in 2018 demonstrated the following T scores: L1-L4 T= -2.4; L FN T= -1.2; R FN T= -0.8; distal third L radius T =-3.0. She last took Boniva in 2023. BMD was not performed for several years. The result of the BMD in 2023 warranted treatment of osteoporosis in that her spine T score was -2.7, which represented worsening over a 5-year period. A discussion took place about the potential treatments (Prolia and zoledronic acid) along with their toxicities.  3. 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 Possible hyperparathyroidism 5. OB history: no pregnancies  6. Family hx: colon cancer, breast cancer, gout 7.  Hypertension: high at the Sep 2023 visit.  She was advised to check it at home.   Plan Lab tests reviewed Prevnar 20 IM right deltoid Contact me She was instructed to consider treatment of osteoporosis with either Prolia or zoledronic acid Take calcium and vit D Systemic Lupus Erythematosus, known as lupus, is a chronic autoimmune disease that can affect any organ in the body posing threats to proper organ function and even to life. Therefore, close surveillance of all bodily functions is required, including but not limited to central and peripheral nervous system, ocular and auditory systems, cardiopulmonary function, kidney function, mucocutaneous and musculoskeletal systems as well as constitutional manifestations. Surveillance consists of history, physical, and laboratory tests. Treatment varies, but most of the drugs used are high risk and therefore also require close monitoring in the form of blood and urine tests. 3 = A little assistance

## 2024-01-23 NOTE — DISCHARGE NOTE NURSING/CASE MANAGEMENT/SOCIAL WORK - PATIENT PORTAL LINK FT
You can access the FollowMyHealth Patient Portal offered by [facility] by registering at the following website: [email or web address]. By joining [employer]’s FollowMyHealth portal, you will also be able to view your health information using other applications (apps) compatible with our system.

## 2024-01-24 ENCOUNTER — TRANSCRIPTION ENCOUNTER (OUTPATIENT)
Age: 69
End: 2024-01-24

## 2024-01-24 LAB
ANION GAP SERPL CALC-SCNC: 9 MMOL/L — SIGNIFICANT CHANGE UP (ref 5–17)
BUN SERPL-MCNC: 19 MG/DL — SIGNIFICANT CHANGE UP (ref 7–23)
CALCIUM SERPL-MCNC: 8.6 MG/DL — SIGNIFICANT CHANGE UP (ref 8.4–10.5)
CHLORIDE SERPL-SCNC: 102 MMOL/L — SIGNIFICANT CHANGE UP (ref 96–108)
CO2 SERPL-SCNC: 24 MMOL/L — SIGNIFICANT CHANGE UP (ref 22–31)
CREAT SERPL-MCNC: 0.98 MG/DL — SIGNIFICANT CHANGE UP (ref 0.5–1.3)
EGFR: 63 ML/MIN/1.73M2 — SIGNIFICANT CHANGE UP
GLUCOSE SERPL-MCNC: 108 MG/DL — HIGH (ref 70–99)
HCT VFR BLD CALC: 31.8 % — LOW (ref 34.5–45)
HGB BLD-MCNC: 10.7 G/DL — LOW (ref 11.5–15.5)
MCHC RBC-ENTMCNC: 30.7 PG — SIGNIFICANT CHANGE UP (ref 27–34)
MCHC RBC-ENTMCNC: 33.6 GM/DL — SIGNIFICANT CHANGE UP (ref 32–36)
MCV RBC AUTO: 91.4 FL — SIGNIFICANT CHANGE UP (ref 80–100)
NRBC # BLD: 0 /100 WBCS — SIGNIFICANT CHANGE UP (ref 0–0)
PLATELET # BLD AUTO: 190 K/UL — SIGNIFICANT CHANGE UP (ref 150–400)
POTASSIUM SERPL-MCNC: 3.9 MMOL/L — SIGNIFICANT CHANGE UP (ref 3.5–5.3)
POTASSIUM SERPL-SCNC: 3.9 MMOL/L — SIGNIFICANT CHANGE UP (ref 3.5–5.3)
RBC # BLD: 3.48 M/UL — LOW (ref 3.8–5.2)
RBC # FLD: 11.9 % — SIGNIFICANT CHANGE UP (ref 10.3–14.5)
SODIUM SERPL-SCNC: 135 MMOL/L — SIGNIFICANT CHANGE UP (ref 135–145)
WBC # BLD: 6.89 K/UL — SIGNIFICANT CHANGE UP (ref 3.8–10.5)
WBC # FLD AUTO: 6.89 K/UL — SIGNIFICANT CHANGE UP (ref 3.8–10.5)

## 2024-01-24 PROCEDURE — 99232 SBSQ HOSP IP/OBS MODERATE 35: CPT

## 2024-01-24 RX ADMIN — Medication 650 MILLIGRAM(S): at 17:16

## 2024-01-24 RX ADMIN — Medication 650 MILLIGRAM(S): at 00:45

## 2024-01-24 RX ADMIN — Medication 650 MILLIGRAM(S): at 12:14

## 2024-01-24 RX ADMIN — TRAMADOL HYDROCHLORIDE 50 MILLIGRAM(S): 50 TABLET ORAL at 16:45

## 2024-01-24 RX ADMIN — POLYETHYLENE GLYCOL 3350 17 GRAM(S): 17 POWDER, FOR SOLUTION ORAL at 21:44

## 2024-01-24 RX ADMIN — TRAMADOL HYDROCHLORIDE 50 MILLIGRAM(S): 50 TABLET ORAL at 21:44

## 2024-01-24 RX ADMIN — Medication 81 MILLIGRAM(S): at 05:10

## 2024-01-24 RX ADMIN — ATORVASTATIN CALCIUM 40 MILLIGRAM(S): 80 TABLET, FILM COATED ORAL at 21:44

## 2024-01-24 RX ADMIN — TRAMADOL HYDROCHLORIDE 50 MILLIGRAM(S): 50 TABLET ORAL at 15:43

## 2024-01-24 RX ADMIN — AMLODIPINE BESYLATE 5 MILLIGRAM(S): 2.5 TABLET ORAL at 05:15

## 2024-01-24 RX ADMIN — TRAMADOL HYDROCHLORIDE 50 MILLIGRAM(S): 50 TABLET ORAL at 09:45

## 2024-01-24 RX ADMIN — Medication 650 MILLIGRAM(S): at 13:06

## 2024-01-24 RX ADMIN — Medication 650 MILLIGRAM(S): at 06:10

## 2024-01-24 RX ADMIN — Medication 81 MILLIGRAM(S): at 17:16

## 2024-01-24 RX ADMIN — Medication 650 MILLIGRAM(S): at 05:10

## 2024-01-24 RX ADMIN — Medication 650 MILLIGRAM(S): at 17:53

## 2024-01-24 RX ADMIN — SENNA PLUS 2 TABLET(S): 8.6 TABLET ORAL at 21:44

## 2024-01-24 RX ADMIN — TRAMADOL HYDROCHLORIDE 50 MILLIGRAM(S): 50 TABLET ORAL at 22:44

## 2024-01-24 RX ADMIN — TRAMADOL HYDROCHLORIDE 50 MILLIGRAM(S): 50 TABLET ORAL at 10:34

## 2024-01-24 NOTE — DISCHARGE NOTE PROVIDER - HOSPITAL COURSE
Admitted 1/22/24  Surgery 1/22/24 Left TKA  Emma-op Antibiotics  Pain control  DVT prophylaxis  OOB/Physical Therapy  Inpatient events: Stable

## 2024-01-24 NOTE — DISCHARGE NOTE PROVIDER - CARE PROVIDER_API CALL
Diogo Olvera  Joint Reconstruction  [address]  [place] [address]  Phone: [phone]  Fax: [phone]  Follow Up Time: 2 weeks

## 2024-01-24 NOTE — DISCHARGE NOTE PROVIDER - NSDCMRMEDTOKEN_GEN_ALL_CORE_FT
amLODIPine 5 mg oral tablet: 1 tab(s) orally once a day  atorvastatin 40 mg oral tablet: 1 tab(s) orally once a day   acetaminophen 325 mg oral tablet: 2 tab(s) orally every 6 hours  amLODIPine 5 mg oral tablet: 1 tab(s) orally once a day  aspirin 81 mg oral tablet, chewable: 1 tab(s) orally 2 times a day  atorvastatin 40 mg oral tablet: 1 tab(s) orally once a day (at bedtime)  polyethylene glycol 3350 oral powder for reconstitution: 17 gram(s) orally once a day (at bedtime)  senna leaf extract oral tablet: 2 tab(s) orally once a day (at bedtime)  traMADol 50 mg oral tablet: 0.5 tab(s) orally every 6 hours As needed Moderate Pain (4 - 6)  traMADol 50 mg oral tablet: 1 tab(s) orally every 6 hours As needed Severe Pain (7 - 10)

## 2024-01-24 NOTE — CHART NOTE - NSCHARTNOTEFT_GEN_A_CORE
Patient requires rolling walker and bedside commode due to difficulty with ambulation s/p left total knee replacement

## 2024-01-24 NOTE — DISCHARGE NOTE PROVIDER - NSDCFUADDINST_GEN_ALL_CORE_FT
Please follow Dr. Olvera’s instruction sheets   ACTIVITY:   - Weight bear as tolerated with assistive device. No strenuous activity, heavy lifting, driving or returning to work until cleared by MD.   - Apply a cold compress to the surgical site several times daily to reduce pain and swelling. For icing, twenty-minute sessions followed by an hour off is recommended. You should ice as frequently as possible. Ice should NEVER be placed directly on the skin. Wearing compression stockings during the first week after surgery can help reduce swelling in your knee, calf and foot, but is not required.      (KNEE REPLACEMENTS)   DO place a pillow under your heel when elevating the leg, to encourage full extension of the knee. Do NOT place a pillow behind your knee for comfort, as this can lead to permanent difficulty straightening your leg. It is normal to develop some swelling in the leg, ankle, and foot because of gravity.     DRESSING/SHOWERING:   (AQUACEL – brown gel dressing)   - You may shower, your dressing is water-resistant. Do not soak in bathtubs. Remove dressing after postop day 7, then leave incision open to air. You may do this yourself (simply peel it off), or you may ask for assistance from your visiting nurse. Keep your incision clean and dry. Do not pick at your incision. Do not apply creams, ointments or oils to your incision until cleared by your surgeon. Do not soak your incision in sitting water (ie tubs, pools, lakes, etc.) until cleared by your surgeon. Do not scrub the incision – instead, allow soap and water to flow over the incision and then pat it dry with a clean towel.     MEDICATION/ANTICOAGULATION:   -You have been prescribed Aspirin 81mg twice daily, as a preventative to help prevent postoperative blood clots. Please take this medication as prescribed.    - You have been prescribed medications for pain:     - Tylenol for mild to moderate pain. Do not exceed 3,000mg daily.     - For more severe pain, take Tylenol with the addition of narcotic pain medication. Take this medication as prescribed. This medication may cause drowsiness or dizziness. Do not operate machinery. This medication may cause constipation.   - For any additional medications, follow instructions on the bottle.    -Try to have regular bowel movements. Take stool softener or laxative if necessary. You may wish to take Miralax daily until you have regular bowel movements.    - If you have been prescribed Aspirin or an anti-inflammatory, please take prilosec (omeprazole) once a day, before breakfast, until no longer taking Aspirin or anti-inflammatory. This will help protect your stomach.   - If you have a pain management physician, please follow-up with them postoperatively.    - If you experience any negative side effects of your medications, please call your surgeon's office to discuss.      FOLLOW-UP:   - You have a follow up appointment scheduled with Dr. Olvera. Please call the office to confirm your appointment.   - Please follow-up with your primary care physician or any other specialist you see postoperatively, if needed.    - Contact your doctor or go to the emergency room if you experience: fever greater than 101.5, chills, chest pain, difficulty breathing, redness or excessive drainage around the incision, other concerns.

## 2024-01-24 NOTE — PROGRESS NOTE ADULT - ASSESSMENT
68F with PMH of HTN and HLD, presenting for elective L total knee replacement. Now POD 2.  Cleared by outpatient providers.     #Post-operative state  #S/p L total knee replacement  - plan per primary team  - recommend bowel regimen while on narcotics  - appreciate input from physical therapy - pending final recs, anticipate DC home     #HTN  #HLD  - continue amlodipine and atorvastatin    #Constipation  - please add dulcolax either PO or suppository for better bowel function  - continue with senna and miralax    Lab data, vital signs and chart reviewed.     Moderate level of medical decision making required for this case, including the presence of two chronic medical issues and discussion of plan with the orthopedic team.

## 2024-01-24 NOTE — DISCHARGE NOTE PROVIDER - NSDCFUSCHEDAPPT_GEN_ALL_CORE_FT
[facility]  ORTHOSUR [address]  Scheduled Appointment: 02/05/2024

## 2024-01-25 VITALS
RESPIRATION RATE: 17 BRPM | HEART RATE: 88 BPM | SYSTOLIC BLOOD PRESSURE: 119 MMHG | DIASTOLIC BLOOD PRESSURE: 68 MMHG | TEMPERATURE: 98 F | OXYGEN SATURATION: 94 %

## 2024-01-25 LAB
ANION GAP SERPL CALC-SCNC: 9 MMOL/L — SIGNIFICANT CHANGE UP (ref 5–17)
BUN SERPL-MCNC: 15 MG/DL — SIGNIFICANT CHANGE UP (ref 7–23)
CALCIUM SERPL-MCNC: 8.8 MG/DL — SIGNIFICANT CHANGE UP (ref 8.4–10.5)
CHLORIDE SERPL-SCNC: 97 MMOL/L — SIGNIFICANT CHANGE UP (ref 96–108)
CO2 SERPL-SCNC: 30 MMOL/L — SIGNIFICANT CHANGE UP (ref 22–31)
CREAT SERPL-MCNC: 1.06 MG/DL — SIGNIFICANT CHANGE UP (ref 0.5–1.3)
EGFR: 58 ML/MIN/1.73M2 — LOW
GLUCOSE SERPL-MCNC: 113 MG/DL — HIGH (ref 70–99)
HCT VFR BLD CALC: 31 % — LOW (ref 34.5–45)
HGB BLD-MCNC: 10.3 G/DL — LOW (ref 11.5–15.5)
MCHC RBC-ENTMCNC: 30.4 PG — SIGNIFICANT CHANGE UP (ref 27–34)
MCHC RBC-ENTMCNC: 33.2 GM/DL — SIGNIFICANT CHANGE UP (ref 32–36)
MCV RBC AUTO: 91.4 FL — SIGNIFICANT CHANGE UP (ref 80–100)
NRBC # BLD: 0 /100 WBCS — SIGNIFICANT CHANGE UP (ref 0–0)
PLATELET # BLD AUTO: 201 K/UL — SIGNIFICANT CHANGE UP (ref 150–400)
POTASSIUM SERPL-MCNC: 4.3 MMOL/L — SIGNIFICANT CHANGE UP (ref 3.5–5.3)
POTASSIUM SERPL-SCNC: 4.3 MMOL/L — SIGNIFICANT CHANGE UP (ref 3.5–5.3)
RBC # BLD: 3.39 M/UL — LOW (ref 3.8–5.2)
RBC # FLD: 12 % — SIGNIFICANT CHANGE UP (ref 10.3–14.5)
SODIUM SERPL-SCNC: 136 MMOL/L — SIGNIFICANT CHANGE UP (ref 135–145)
WBC # BLD: 7.19 K/UL — SIGNIFICANT CHANGE UP (ref 3.8–10.5)
WBC # FLD AUTO: 7.19 K/UL — SIGNIFICANT CHANGE UP (ref 3.8–10.5)

## 2024-01-25 PROCEDURE — C1889: CPT

## 2024-01-25 PROCEDURE — C1713: CPT

## 2024-01-25 PROCEDURE — 97116 GAIT TRAINING THERAPY: CPT

## 2024-01-25 PROCEDURE — 97161 PT EVAL LOW COMPLEX 20 MIN: CPT

## 2024-01-25 PROCEDURE — 80048 BASIC METABOLIC PNL TOTAL CA: CPT

## 2024-01-25 PROCEDURE — S2900: CPT

## 2024-01-25 PROCEDURE — C1776: CPT

## 2024-01-25 PROCEDURE — 73560 X-RAY EXAM OF KNEE 1 OR 2: CPT

## 2024-01-25 PROCEDURE — 85027 COMPLETE CBC AUTOMATED: CPT

## 2024-01-25 PROCEDURE — 36415 COLL VENOUS BLD VENIPUNCTURE: CPT

## 2024-01-25 RX ORDER — ACETAMINOPHEN 500 MG
2 TABLET ORAL
Qty: 0 | Refills: 0 | DISCHARGE
Start: 2024-01-25

## 2024-01-25 RX ORDER — TRAMADOL HYDROCHLORIDE 50 MG/1
1 TABLET ORAL
Qty: 0 | Refills: 0 | DISCHARGE
Start: 2024-01-25

## 2024-01-25 RX ORDER — TRAMADOL HYDROCHLORIDE 50 MG/1
0.5 TABLET ORAL
Qty: 0 | Refills: 0 | DISCHARGE
Start: 2024-01-25

## 2024-01-25 RX ORDER — SENNA PLUS 8.6 MG/1
2 TABLET ORAL
Qty: 0 | Refills: 0 | DISCHARGE
Start: 2024-01-25

## 2024-01-25 RX ORDER — ASPIRIN/CALCIUM CARB/MAGNESIUM 324 MG
1 TABLET ORAL
Qty: 0 | Refills: 0 | DISCHARGE
Start: 2024-01-25

## 2024-01-25 RX ORDER — POLYETHYLENE GLYCOL 3350 17 G/17G
17 POWDER, FOR SOLUTION ORAL
Qty: 0 | Refills: 0 | DISCHARGE
Start: 2024-01-25

## 2024-01-25 RX ORDER — ATORVASTATIN CALCIUM 80 MG/1
1 TABLET, FILM COATED ORAL
Qty: 0 | Refills: 0 | DISCHARGE
Start: 2024-01-25

## 2024-01-25 RX ORDER — AMLODIPINE BESYLATE 2.5 MG/1
1 TABLET ORAL
Qty: 0 | Refills: 0 | DISCHARGE
Start: 2024-01-25

## 2024-01-25 RX ADMIN — Medication 650 MILLIGRAM(S): at 02:51

## 2024-01-25 RX ADMIN — Medication 650 MILLIGRAM(S): at 07:38

## 2024-01-25 RX ADMIN — Medication 81 MILLIGRAM(S): at 05:49

## 2024-01-25 RX ADMIN — Medication 650 MILLIGRAM(S): at 13:52

## 2024-01-25 RX ADMIN — Medication 650 MILLIGRAM(S): at 08:30

## 2024-01-25 RX ADMIN — Medication 650 MILLIGRAM(S): at 03:51

## 2024-01-25 RX ADMIN — TRAMADOL HYDROCHLORIDE 50 MILLIGRAM(S): 50 TABLET ORAL at 15:16

## 2024-01-25 RX ADMIN — AMLODIPINE BESYLATE 5 MILLIGRAM(S): 2.5 TABLET ORAL at 05:49

## 2024-01-25 RX ADMIN — TRAMADOL HYDROCHLORIDE 50 MILLIGRAM(S): 50 TABLET ORAL at 16:16

## 2024-01-25 NOTE — PROGRESS NOTE ADULT - SUBJECTIVE AND OBJECTIVE BOX
Ortho Note    Pt comfortable without complaints, pain controlled  Denies CP, SOB, N/V, numbness/tingling     Vital Signs Last 24 Hrs  T(C): 37 (01-25-24 @ 04:57), Max: 37 (01-25-24 @ 04:57)  T(F): 98.6 (01-25-24 @ 04:57), Max: 98.6 (01-25-24 @ 04:57)  HR: 71 (01-25-24 @ 04:57) (71 - 71)  BP: 130/77 (01-25-24 @ 04:57) (130/77 - 130/77)  BP(mean): --  RR: 18 (01-25-24 @ 04:57) (18 - 18)  SpO2: 96% (01-25-24 @ 04:57) (96% - 96%)  I&O's Summary    23 Jan 2024 07:01  -  24 Jan 2024 07:00  --------------------------------------------------------  IN: 480 mL / OUT: 0 mL / NET: 480 mL    24 Jan 2024 07:01  -  25 Jan 2024 06:27  --------------------------------------------------------  IN: 480 mL / OUT: 800 mL / NET: -320 mL        General: Pt Alert and oriented, NAD  Prineo/aquacel DSG C/D/I  Pulses: 2+ DP/PT b/l  Sensation: SILT b/l  Motor: Quad/Ham/EHL/FHL/TA/GS  5/5                          10.3   7.19  )-----------( 201      ( 25 Jan 2024 05:30 )             31.0     01-24    135  |  102  |  19  ----------------------------<  108<H>  3.9   |  24  |  0.98    Ca    8.6      24 Jan 2024 06:55        A/P: 68yFemale POD#3 s/p L TKA  - Stable  - Pain Control: c/w Tramadol  - DVT ppx: ASA 81 BID  - PT, WBS:  WBAT  - Dispo: Home    Ortho Pager [phone]
Ortho Note    Pt comfortable without complaints, says pain is 4/10 with only tylenol. Says she can't tolerate oxycodone.  Denies CP, SOB, N/V, numbness/tingling       Vital Signs Last 24 Hrs  T(C): 36.9 (01-23-24 @ 05:05), Max: 36.9 (01-23-24 @ 05:05)  T(F): 98.4 (01-23-24 @ 05:05), Max: 98.4 (01-23-24 @ 05:05)  HR: 76 (01-23-24 @ 05:05) (76 - 83)  BP: 142/68 (01-23-24 @ 05:05) (134/83 - 142/68)  BP(mean): --  RR: 17 (01-23-24 @ 05:05) (17 - 18)  SpO2: 96% (01-23-24 @ 05:05) (95% - 96%)  I&O's Summary    22 Jan 2024 07:01  -  23 Jan 2024 07:00  --------------------------------------------------------  IN: 3175 mL / OUT: 300 mL / NET: 2875 mL        General: Pt Alert and oriented, NAD  Prineo/aquacel DSG C/D/I  Pulses: 2+ DP/PT b/l  Sensation: SILT b/l  Motor: Quad/Ham/EHL/FHL/TA/GS  5/5              A/P: 68yFemale POD#1 s/p L TKA  - Stable  - Pain Control, start tramadol instead of oxy  - DVT ppx: ASA 81 BID  - PT, WBS:  WBAT  - Dispo: Home    Ortho Pager [phone]
Ortho Note    Subjective:  Pt seen and examined today, patient reports pain controlled with current pain medication regimen  Denies CP, SOB, N/V, numbness/tingling   Reviewed plan of care with patient at bedside    Vital Signs Last 24 Hrs  T(C): 36.9 (01-25-24 @ 09:00), Max: 36.9 (01-25-24 @ 09:00)  T(F): 98.4 (01-25-24 @ 09:00), Max: 98.4 (01-25-24 @ 09:00)  HR: 88 (01-25-24 @ 09:00) (88 - 88)  BP: 119/68 (01-25-24 @ 09:00) (119/68 - 119/68)  BP(mean): --  RR: 17 (01-25-24 @ 09:00) (17 - 17)  SpO2: 94% (01-25-24 @ 09:00) (94% - 94%)  AVSS    Objective:    Physical Exam:  General: Pt Alert and oriented, NAD  Left Knee aquacel  DSG C/D/I  Pulses: +2 pedal pulses, wwp toes   Sensation: sabrina tintact bilateral lower extremites  Motor: EHL/FHL/TA/GS- 5/5 bilateral lower extremities        Plan of Care:  A/P: 68yFemale POD#3 s/p Left TKA  - afebrile  - Pain Control-  tramadol 25-50mg PO daily, tylenol 650mg PO Q6h, Dilaudid 0.5mg Q4h prn breakthrough pain,   - DVT ppx: aspirin 81mg PO BID  - PT, WBS: WBAT   - ambulate with pt as tolerated  - bowel regimen, IS use, PPI   - appreciate medicine recs  - Dispo- home pending pt clearance     Ortho Pager [phone]
Ortho Post Op Check    Procedure:  L TKA   Surgeon: Dr. ELIDA Olvera    Pt comfortable without complaints, pain controlled  Denies CP, SOB, N/V, numbness/tingling     Vital Signs Last 24 Hrs  T(C): 36.6 (01-22-24 @ 17:30), Max: 36.6 (01-22-24 @ 17:30)  T(F): 97.9 (01-22-24 @ 17:30), Max: 97.9 (01-22-24 @ 17:30)  HR: 82 (01-22-24 @ 17:30) (68 - 82)  BP: 147/80 (01-22-24 @ 17:30) (91/51 - 158/70)  BP(mean): 101 (01-22-24 @ 17:10) (65 - 101)  RR: 18 (01-22-24 @ 17:30) (12 - 24)  SpO2: 98% (01-22-24 @ 17:30) (93% - 98%)    General: Pt Alert and oriented, NAD  DSG C/D/I  Pulses: 2+ DP  Sensation: SILT grossly SPN/DPN/Saph/Linda/Tib  Motor: 5/5 TA/GS/EHL, Quad/Psoas firing limited 2/2 pain    Post-op X-Ray: hardware intact w/o fracture    A/P: 68yFemale s/p L tka  by Dr. ELIDA Olvera on 01-22  - Stable  - Pain Control  - DVT ppx: ASA   - Post op abx: Ancef  - WBS: WBAT  - PT eval     Ortho Pager [phone]
Patient requires a comode at home due to recent surgery, s/p Left TKA
has some pain after working with PT.  Reports that she hasn't had a bowel movement in 3 days, but took some laxatives this morning.      Remaining ROS negative   PHYSICAL EXAM:      Physical exam  General: no acute distress, sitting up in bed  HEENT: ncat, mmm  cards: rrr, normal S1S2, no mrg  pulm: ctab, no wheeze, crackles, rales or rhonchi  ab: soft, nontender, nondistended normoactive bowel sounds  ext: no edema, wwp  neuro: speech is fluent, no facial asymmetry, follows commands  skin: warm and dry, no obvious rashes or lesions present      VITAL SIGNS:  Vital Signs Last 24 Hrs  T(C): 36.6 (24 Jan 2024 08:28), Max: 36.9 (24 Jan 2024 05:12)  T(F): 97.8 (24 Jan 2024 08:28), Max: 98.5 (24 Jan 2024 05:12)  HR: 83 (24 Jan 2024 08:28) (78 - 83)  BP: 129/60 (24 Jan 2024 08:28) (129/60 - 156/79)  BP(mean): --  RR: 16 (24 Jan 2024 08:28) (16 - 17)  SpO2: 97% (24 Jan 2024 08:28) (95% - 97%)    Parameters below as of 24 Jan 2024 08:28  Patient On (Oxygen Delivery Method): room air      MEDICATIONS:  MEDICATIONS  (STANDING):  acetaminophen     Tablet .. 650 milliGRAM(s) Oral every 6 hours  amLODIPine   Tablet 5 milliGRAM(s) Oral daily  aspirin  chewable 81 milliGRAM(s) Oral two times a day  atorvastatin 40 milliGRAM(s) Oral at bedtime  lactated ringers. 1000 milliLiter(s) (150 mL/Hr) IV Continuous <Continuous>  polyethylene glycol 3350 17 Gram(s) Oral at bedtime  senna 2 Tablet(s) Oral at bedtime    MEDICATIONS  (PRN):  bisacodyl Suppository 10 milliGRAM(s) Rectal daily PRN Constipation  HYDROmorphone  Injectable 0.5 milliGRAM(s) IV Push every 15 minutes PRN Breakthrough pain  HYDROmorphone  Injectable 0.5 milliGRAM(s) IV Push every 4 hours PRN Breakthrough pain  HYDROmorphone  Injectable 0.5 milliGRAM(s) IV Push every 15 minutes PRN Breakthrough pain  magnesium hydroxide Suspension 30 milliLiter(s) Oral daily PRN Constipation  ondansetron Injectable 4 milliGRAM(s) IV Push every 6 hours PRN Nausea and/or Vomiting  traMADol 50 milliGRAM(s) Oral every 6 hours PRN Severe Pain (7 - 10)  traMADol 25 milliGRAM(s) Oral every 6 hours PRN Moderate Pain (4 - 6)      ALLERGIES:  Allergies    penicillin (Pruritus)    Intolerances      LABS:                        10.7   6.89  )-----------( 190      ( 24 Jan 2024 06:55 )             31.8     01-24    135  |  102  |  19  ----------------------------<  108<H>  3.9   |  24  |  0.98    Ca    8.6      24 Jan 2024 06:55        Urinalysis Basic - ( 24 Jan 2024 06:55 )    Color: x / Appearance: x / SG: x / pH: x  Gluc: 108 mg/dL / Ketone: x  / Bili: x / Urobili: x   Blood: x / Protein: x / Nitrite: x   Leuk Esterase: x / RBC: x / WBC x   Sq Epi: x / Non Sq Epi: x / Bacteria: x      CAPILLARY BLOOD GLUCOSE          RADIOLOGY & ADDITIONAL TESTS: Reviewed.
Ortho Note    Patient is Japanese Speaking -  used for encounter [id number]  Patient seen and examined at bedside. Pt endorses incisional knee pain.   Denies CP, SOB, N/V, new numbness/tingling     Vital Signs Last 24 Hrs  T(C): 36.6 (01-24-24 @ 08:28), Max: 36.9 (01-24-24 @ 05:12)  T(F): 97.8 (01-24-24 @ 08:28), Max: 98.5 (01-24-24 @ 05:12)  HR: 83 (01-24-24 @ 08:28) (78 - 83)  BP: 129/60 (01-24-24 @ 08:28) (129/60 - 156/79)  BP(mean): --  RR: 16 (01-24-24 @ 08:28) (16 - 17)  SpO2: 97% (01-24-24 @ 08:28) (95% - 97%)  I&O's Summary    23 Jan 2024 07:01  -  24 Jan 2024 07:00  --------------------------------------------------------  IN: 480 mL / OUT: 0 mL / NET: 480 mL    24 Jan 2024 07:01  -  24 Jan 2024 09:52  --------------------------------------------------------  IN: 240 mL / OUT: 0 mL / NET: 240 mL        General: Pt Alert and oriented, NAD  DSG C/D/I- Aquacel to left knee. JEFF stockings. Cryocuff in place.   Pulses: 2+ DP pulses palpable bilaterally, skin wwp, cap refill brisk, no calf tenderness bilaterally   Sensation: SILT to bilateral lower extremities  Motor: EHL/FHL/TA/GS 5/5 bilaterally                           10.7   6.89  )-----------( 190      ( 24 Jan 2024 06:55 )             31.8     01-24    135  |  102  |  19  ----------------------------<  108<H>  3.9   |  24  |  0.98    Ca    8.6      24 Jan 2024 06:55        A/P: 67yo Female POD#2 s/p Left TKA   - Stable, appreciate medical comanagement  - Pain Control  - OOB/IS  - Bowel Regimen  - DVT ppx: ASA 81mg BID  - PT, WBS: WBAT  - Dispo: HPT pending PT clearance    Ortho Pager [phone]
Ortho Note    Pt comfortable without complaints, pain controlled  Denies CP, SOB, N/V, numbness/tingling     Vital Signs Last 24 Hrs  T(C): 36.9 (01-24-24 @ 05:12), Max: 36.9 (01-24-24 @ 05:12)  T(F): 98.5 (01-24-24 @ 05:12), Max: 98.5 (01-24-24 @ 05:12)  HR: 78 (01-24-24 @ 05:12) (78 - 78)  BP: 156/79 (01-24-24 @ 05:12) (156/79 - 156/79)  BP(mean): --  RR: 17 (01-24-24 @ 05:12) (17 - 17)  SpO2: 95% (01-24-24 @ 05:12) (95% - 95%)  I&O's Summary    22 Jan 2024 07:01  -  23 Jan 2024 07:00  --------------------------------------------------------  IN: 3175 mL / OUT: 300 mL / NET: 2875 mL    23 Jan 2024 07:01  -  24 Jan 2024 06:13  --------------------------------------------------------  IN: 480 mL / OUT: 0 mL / NET: 480 mL        General: Pt Alert and oriented, NAD  Prinaleksandr/aquacel DSG C/D/I  Pulses: 2+ DP/PT b/l  Sensation: SILT b/l  Motor: Quad/Ham/EHL/FHL/TA/GS  5/5                          11.8   9.15  )-----------( 210      ( 23 Jan 2024 05:30 )             34.7     01-23    136  |  102  |  19  ----------------------------<  162<H>  3.9   |  25  |  1.04    Ca    9.0      23 Jan 2024 05:30        A/P: 68yFemale POD#2 s/p L TKA  - Stable  - Pain Control: c/w Tramadol  - DVT ppx: ASA 81 BID  - PT, WBS:  WBAT  - Dispo: Home    Ortho Pager [phone]
Ortho Note    Subjective:  Pt comfortable without complaints, pain controlled with current pain medication regimen   Denies CP, SOB, N/V, numbness/tingling   Reviewed plan of care with patient at bedside      Vital Signs Last 24 Hrs  T(C): 36.7 (01-23-24 @ 08:35), Max: 36.7 (01-23-24 @ 08:35)  T(F): 98.1 (01-23-24 @ 08:35), Max: 98.1 (01-23-24 @ 08:35)  HR: 73 (01-23-24 @ 08:35) (73 - 73)  BP: 137/82 (01-23-24 @ 11:00) (128/74 - 137/82)  BP(mean): 92 (01-23-24 @ 08:35) (92 - 92)  RR: 16 (01-23-24 @ 08:35) (16 - 16)  SpO2: 96% (01-23-24 @ 08:35) (96% - 96%)  AVSS    Objective:    Physical Exam:  General: Pt Alert and oriented, NAD  Prineo/aquacel DSG C/D/I  Pulses: 2+ DP/PT b/l  Sensation: SILT b/l  Motor: Quad/Ham/EHL/FHL/TA/GS  5/5          Plan of Care:  A/P: 68yFemale POD#1 s/p L tka  - afebrile   - Pain Control- Dilaudid 0.5mg Q4h prn breakthrough pain, tramadol 25-50mg PO Q6h prn moderate to severe pain   - DVT ppx: aspirin 81mg PO BID  - PT, WBS: WBAT  - ambualte with pt as tolerated  - bowel regimen, IS use, PPI   - appreciate medicine recs  - Dispo- home pending pt clearance    Ortho Pager [phone]

## 2024-01-25 NOTE — PROGRESS NOTE ADULT - PROVIDER SPECIALTY LIST ADULT
Orthopedics
Hospitalist
Orthopedics

## 2024-01-26 ENCOUNTER — APPOINTMENT (OUTPATIENT)
Dept: HEART AND VASCULAR | Facility: CLINIC | Age: 69
End: 2024-01-26

## 2024-01-29 DIAGNOSIS — M17.12 UNILATERAL PRIMARY OSTEOARTHRITIS, LEFT KNEE: ICD-10-CM

## 2024-01-29 DIAGNOSIS — I10 ESSENTIAL (PRIMARY) HYPERTENSION: ICD-10-CM

## 2024-01-29 DIAGNOSIS — Z79.891 LONG TERM (CURRENT) USE OF OPIATE ANALGESIC: ICD-10-CM

## 2024-01-29 DIAGNOSIS — Z79.82 LONG TERM (CURRENT) USE OF ASPIRIN: ICD-10-CM

## 2024-01-29 DIAGNOSIS — E78.5 HYPERLIPIDEMIA, UNSPECIFIED: ICD-10-CM

## 2024-01-29 DIAGNOSIS — Z88.0 ALLERGY STATUS TO PENICILLIN: ICD-10-CM

## 2024-01-29 DIAGNOSIS — Z91.81 HISTORY OF FALLING: ICD-10-CM

## 2024-01-29 DIAGNOSIS — Z98.890 OTHER SPECIFIED POSTPROCEDURAL STATES: ICD-10-CM

## 2024-01-29 DIAGNOSIS — K59.00 CONSTIPATION, UNSPECIFIED: ICD-10-CM

## 2024-02-05 ENCOUNTER — APPOINTMENT (OUTPATIENT)
Dept: ORTHOPEDIC SURGERY | Facility: CLINIC | Age: 69
End: 2024-02-05
Payer: MEDICARE

## 2024-02-05 VITALS
HEIGHT: 64 IN | OXYGEN SATURATION: 97 % | SYSTOLIC BLOOD PRESSURE: 126 MMHG | WEIGHT: 180 LBS | HEART RATE: 96 BPM | BODY MASS INDEX: 30.73 KG/M2 | DIASTOLIC BLOOD PRESSURE: 81 MMHG

## 2024-02-05 PROBLEM — M19.90 UNSPECIFIED OSTEOARTHRITIS, UNSPECIFIED SITE: Chronic | Status: ACTIVE | Noted: 2024-01-19

## 2024-02-05 PROCEDURE — 99024 POSTOP FOLLOW-UP VISIT: CPT

## 2024-02-05 RX ORDER — TRAMADOL HYDROCHLORIDE 50 MG/1
50 TABLET, COATED ORAL
Qty: 50 | Refills: 0 | Status: ACTIVE | COMMUNITY
Start: 2024-02-05 | End: 1900-01-01

## 2024-02-07 NOTE — ADDENDUM
[FreeTextEntry1] : I, Sheryl Ruano, acted as a scribe on behalf of Dr. Olvera 02/05/2024.  I, Nicholas Pichardo, acted as a scribe on behalf of Dr. Olvera 02/05/2024.

## 2024-02-07 NOTE — END OF VISIT
[FreeTextEntry3] : All medical record entries made by the Scribe were at my, Dr. iDogo Olvera, direction and personally dictated by me on 02/05/2024. I have reviewed the chart and agree that the record accurately reflects my personal performance of the history, physical exam, assessment and plan. I have also personally directed, reviewed, and agreed with the chart.

## 2024-02-07 NOTE — HISTORY OF PRESENT ILLNESS
[de-identified] : 1st post op for LT TKR- January 22, 2024 [de-identified] : 02/05/24: 78 y/o female presenting now about 2 status post left total knee arthroplasty. She has been rehabilitating at [facility] and states that she has been participating in constant physical therapy and has noted improvement in her gait. She presents today without the use of any assistive devices and endorses a slow but antalgic gait. Denies any fevers, chills or drainage from the surgical site. She states she has been taking Tylenol and Celebrex for pain and using the Aspirin 2x a day. She is planning on being discharged from the rehab center in about 5 days.  [de-identified] : General appearance: well nourished and hydrated, pleasant, alert and oriented x 3, cooperative. HEENT: normocephalic, EOM intact, wearing mask, external auditory canal clear. Cardiovascular: no lower leg edema, no varicosities, dorsalis pedis pulses palpable and symmetric. Lymphatics: no palpable lymphadenopathy, no lymphedema. Neurologic: sensation is normal, no muscle weakness in upper or lower extremities, patella tendon reflexes present and symmetric. Dermatologic: skin moist, warm, no rash. Spine: cervical spine with normal lordosis and painless range of motion, thoracic spine with normal kyphosis and painless range of motion, lumbosacral spine with normal lordosis and painless range of motion.  No tenderness to palpation along midline spine and paraspinal musculature.  Sacroiliac joints non-tender bilaterally. Negative SLR and crossed SLR tests bilaterally. Gait: She endorses an antalgic without the use of any assistive devices today. Left knee: - Focal soft tissue swelling: none - Ecchymosis: none - Erythema: none - Effusion: none, no palpable Baker's cyst - Wounds: well-healed anterior surgical incision wound, benign appearing - Alignment: normal - Tenderness: tenderness along the lateral joint line - ROM: 3-90 - Collateral laxity: none - Cruciate laxity: none - Meniscal signs: negative Markie and Tracie - Quad strength: 5/5 [de-identified] : 68F now about 2 weeks status post left total knee replacement.  [de-identified] : Imp: 76 y/o female now about 2 weeks status post left total knee arthroplasty, overall doing well.  - I encouraged her to continue with theTylenol and Celebrex for pain management and to continue the Aspirin medication until completion.  - I provided her with the physical therapy script as well as the home exercise program.  - She will follow up in 4 weeks with left knee x-rays.

## 2024-03-04 ENCOUNTER — APPOINTMENT (OUTPATIENT)
Dept: ORTHOPEDIC SURGERY | Facility: CLINIC | Age: 69
End: 2024-03-04

## 2024-03-19 PROBLEM — R00.2 PALPITATIONS: Chronic | Status: ACTIVE | Noted: 2024-01-22

## 2024-03-19 PROBLEM — Z86.39 PERSONAL HISTORY OF OTHER ENDOCRINE, NUTRITIONAL AND METABOLIC DISEASE: Chronic | Status: ACTIVE | Noted: 2024-01-19

## 2024-03-19 PROBLEM — M54.16 RADICULOPATHY, LUMBAR REGION: Chronic | Status: ACTIVE | Noted: 2024-01-19

## 2024-04-30 ENCOUNTER — OUTPATIENT (OUTPATIENT)
Dept: OUTPATIENT SERVICES | Facility: HOSPITAL | Age: 69
LOS: 1 days | End: 2024-04-30
Payer: MEDICARE

## 2024-04-30 ENCOUNTER — APPOINTMENT (OUTPATIENT)
Dept: ORTHOPEDIC SURGERY | Facility: CLINIC | Age: 69
End: 2024-04-30
Payer: MEDICARE

## 2024-04-30 ENCOUNTER — RESULT REVIEW (OUTPATIENT)
Age: 69
End: 2024-04-30

## 2024-04-30 VITALS
WEIGHT: 169 LBS | HEART RATE: 74 BPM | BODY MASS INDEX: 28.85 KG/M2 | SYSTOLIC BLOOD PRESSURE: 156 MMHG | OXYGEN SATURATION: 99 % | DIASTOLIC BLOOD PRESSURE: 86 MMHG | HEIGHT: 64 IN

## 2024-04-30 DIAGNOSIS — Z47.1 AFTERCARE FOLLOWING JOINT REPLACEMENT SURGERY: ICD-10-CM

## 2024-04-30 DIAGNOSIS — Z98.890 OTHER SPECIFIED POSTPROCEDURAL STATES: Chronic | ICD-10-CM

## 2024-04-30 DIAGNOSIS — Z96.652 AFTERCARE FOLLOWING JOINT REPLACEMENT SURGERY: ICD-10-CM

## 2024-04-30 PROCEDURE — 73562 X-RAY EXAM OF KNEE 3: CPT | Mod: 26,LT

## 2024-04-30 PROCEDURE — 73562 X-RAY EXAM OF KNEE 3: CPT

## 2024-04-30 PROCEDURE — 99213 OFFICE O/P EST LOW 20 MIN: CPT

## 2024-04-30 NOTE — ADDENDUM
[FreeTextEntry1] : I, Freedom Taveras, documented this note as a scribe on behalf of Dr. Diogo Olvera on 04/30/2024.

## 2024-04-30 NOTE — END OF VISIT
[FreeTextEntry3] : All medical record entries made by the Scribe were at my, Dr. Diogo Olvera, direction and personally dictated by me on 04/30/2024. I have reviewed the chart and agree that the record accurately reflects my personal performance of the history, physical exam, assessment and plan. I have also personally directed, reviewed, and agreed with the chart.

## 2024-04-30 NOTE — HISTORY OF PRESENT ILLNESS
[de-identified] : 2ND post op for LT TKR. SX DATE January 22, 2024.   [de-identified] : 04/30/2024: 68 year old female 3.5 months s/p L TKA. She has been able to participate in PT, however she complains of ongoing knee swelling which worsens over the course of the day. [de-identified] : General appearance: well nourished and hydrated, pleasant, alert and oriented x 3, cooperative.   HEENT: normocephalic, EOM intact, wearing mask, external auditory canal clear.   Cardiovascular: no lower leg edema, no varicosities, dorsalis pedis pulses palpable and symmetric.   Lymphatics: no palpable lymphadenopathy, no lymphedema.   Neurologic: sensation is normal, no muscle weakness in upper or lower extremities, patella tendon reflexes present and symmetric.   Dermatologic: skin moist, warm, no rash.   Spine: cervical spine with normal lordosis and painless range of motion, thoracic spine with normal kyphosis and painless range of motion, lumbosacral spine with normal lordosis and painless range of motion.  No tenderness to palpation along midline spine and paraspinal musculature.  Sacroiliac joints nontender bilaterally. Negative SLR and crossed SLR tests bilaterally. Gait: presents using a cane; without the cane, she demonstrates a cautious gait pattern without specific antalgia.    Left knee:  - Focal soft tissue swelling: none - Ecchymosis: none - Erythema: none - Effusion: moderate - Wounds: well-healed midline incision, benign-appearing  - Alignment: normal - Tenderness: none - ROM: 3-95, limited by pain and apprehension - Collateral laxity: none - Cruciate laxity: none - Meniscal signs: negative Markie and Tracie - Popliteal angle (degrees): 20 - Quad strength: 4+/5; demonstrates 5-10 degree extensor lag [de-identified] : XRs of the left knee taken today, 04/30/2024, at [facility], demonstrates stable appearance of a left total knee arthroplasty as compared to prior imaging without evidence of mechanical complication. Patella height: Normal Patella tracking: Central - A joint effusion is present [de-identified] : Imp: 68 year old female 3.5 months s/p L TKA with ongoing mild arthrofibrosis [de-identified] : - Continue with PT, as she still does have significantly limited ROM; and she needs to work on improving knee flexion - Follow up 3 months with no new XRs to review progress in gait pattern and left knee ROM

## 2024-08-02 ENCOUNTER — APPOINTMENT (OUTPATIENT)
Dept: ORTHOPEDIC SURGERY | Facility: CLINIC | Age: 69
End: 2024-08-02

## 2024-08-02 VITALS
BODY MASS INDEX: 28.85 KG/M2 | WEIGHT: 169 LBS | DIASTOLIC BLOOD PRESSURE: 80 MMHG | SYSTOLIC BLOOD PRESSURE: 123 MMHG | HEIGHT: 64 IN | TEMPERATURE: 98.7 F | OXYGEN SATURATION: 98 %

## 2024-08-02 DIAGNOSIS — Z47.1 AFTERCARE FOLLOWING JOINT REPLACEMENT SURGERY: ICD-10-CM

## 2024-08-02 DIAGNOSIS — Z96.652 AFTERCARE FOLLOWING JOINT REPLACEMENT SURGERY: ICD-10-CM

## 2024-08-02 DIAGNOSIS — M17.12 UNILATERAL PRIMARY OSTEOARTHRITIS, LEFT KNEE: ICD-10-CM

## 2024-08-02 PROCEDURE — 99213 OFFICE O/P EST LOW 20 MIN: CPT

## 2024-08-06 NOTE — PHYSICAL EXAM
[de-identified] : General appearance: well nourished and hydrated, pleasant, alert and oriented x 3, cooperative. HEENT: normocephalic, EOM intact, wearing mask, external auditory canal clear. Cardiovascular: no lower leg edema, no varicosities, dorsalis pedis pulses palpable and symmetric. Lymphatics: no palpable lymphadenopathy, no lymphedema. Neurologic: sensation is normal, no muscle weakness in upper or lower extremities, patella tendon reflexes present and symmetric. Dermatologic: skin moist, warm, no rash. Spine: cervical spine with normal lordosis and painless range of motion, thoracic spine with normal kyphosis and painless range of motion, lumbosacral spine with normal lordosis and painless range of motion.  No tenderness to palpation along midline spine and paraspinal musculature.  Sacroiliac joints nontender bilaterally. Negative SLR and crossed SLR tests bilaterally. Gait: Presents without the use of an assistive device. Demonstrates a stable nonantalgic gait pattern. She is still slightly cautious.    Left knee: - Focal soft tissue swelling: none - Ecchymosis: none - Erythema: none - Effusion: moderate, No palpable Baker's cysts - Wounds: Well-healed midline incision, benign appearing. There is a scaly rash overlying the left lower quadrant adjacent to her incision, but there is no cellulitis.  - Alignment: normal - Tenderness: none - ROM: 2-95 - Collateral laxity: none - Cruciate laxity: none - Popliteal angle (degrees): 20 - Quad strength: 5/5, 5-7 degree extensor lag

## 2024-08-06 NOTE — ADDENDUM
[FreeTextEntry1] : I, Dahiana Fernandez, documented this note as a scribe on behalf of Dr. Diogo Olvera on 08/02/2024.

## 2024-08-06 NOTE — PHYSICAL EXAM
[de-identified] : General appearance: well nourished and hydrated, pleasant, alert and oriented x 3, cooperative. HEENT: normocephalic, EOM intact, wearing mask, external auditory canal clear. Cardiovascular: no lower leg edema, no varicosities, dorsalis pedis pulses palpable and symmetric. Lymphatics: no palpable lymphadenopathy, no lymphedema. Neurologic: sensation is normal, no muscle weakness in upper or lower extremities, patella tendon reflexes present and symmetric. Dermatologic: skin moist, warm, no rash. Spine: cervical spine with normal lordosis and painless range of motion, thoracic spine with normal kyphosis and painless range of motion, lumbosacral spine with normal lordosis and painless range of motion.  No tenderness to palpation along midline spine and paraspinal musculature.  Sacroiliac joints nontender bilaterally. Negative SLR and crossed SLR tests bilaterally. Gait: Presents without the use of an assistive device. Demonstrates a stable nonantalgic gait pattern. She is still slightly cautious.    Left knee: - Focal soft tissue swelling: none - Ecchymosis: none - Erythema: none - Effusion: moderate, No palpable Baker's cysts - Wounds: Well-healed midline incision, benign appearing. There is a scaly rash overlying the left lower quadrant adjacent to her incision, but there is no cellulitis.  - Alignment: normal - Tenderness: none - ROM: 2-95 - Collateral laxity: none - Cruciate laxity: none - Popliteal angle (degrees): 20 - Quad strength: 5/5, 5-7 degree extensor lag

## 2024-08-06 NOTE — REASON FOR VISIT
[Follow-Up Visit] : a follow-up visit for [Joint Replacement Surgery, Aftercare] : joint replacement surgery, aftercare [Interpreters_IDNumber] : [id number] [Interpreters_Relationshiptopatient] : [employer] [Interpreters_FullName] : Mariusz [TWNoteComboBox1] : [place]isian

## 2024-08-06 NOTE — END OF VISIT
[FreeTextEntry3] : All medical record entries made by the Scribe were at my, Dr. Diogo Olvera, direction and personally dictated by me on 08/02/2024. I have reviewed the chart and agree that the record accurately reflects my personal performance of the history, physical exam, assessment and plan. I have also personally directed, reviewed, and agreed with the chart.

## 2024-08-06 NOTE — HISTORY OF PRESENT ILLNESS
[de-identified] : R TKA 6/6/22 L TKA 01/22/2024 08/02/2024: 68 year old female now about 6 months s/p left total knee arthroplasty. She had been continuing to participate in PT up until last month. She discontinued because she was living in [place] for a few weeks. She reports she has relatively mild, waxing/waning postop pain at this point. She is ambulating without the use of an assistive device.   11/10/2023: 68-year-old Slovenian-speaking female presenting about 14 months status post right total knee arthroplasty as well as follow-up for left knee osteoarthritis.  Patient was last seen in office on June of this year, was administered a LT knee cortisone injection. She states that the left knee cortisone injection did not provide her any benefit. She's been taking Motrin as needed for pain. She said she attended physical therapy and did not find much benefit from physical therapy at all thus far. She would like to discuss her left total knee replacement today.  06/13/2023: 68 y/o female f/u approximately 1 year s/p right TKA. She reports that the right knee is still somewhat painful when she hyperflexes the knee. Her chief complaint today is her right ankle which has been very painful for the past 2 weeks. She denies injury or fall but has been using a cane and Motrin due to severe pain. She states that her left knee lona and is painful consistently. She notes that the left knee is generally nonpainful but she has sudden unpredictable episodes of sharp pain that are associated with buckling.  This occurs on a daily basis.  She denies having any falls associated with these episodes.   2/23/22: 67y/o female, Slovenian speaking, accompanied by grandson for evaluation of bilateral knee pain and stiffness. Symptoms progressive for the past 4-5 years. Treatments thus far have included PT, Tylenol, various OTC NSAIDs, multiple corticosteroid injections, and bilateral knee arthroscopies (right 2017, left 2018). Complains of ongoing mostly medial pain with any activity. Ambulatory tolerance less than a block but remains able to perform ADLs with pain. Here to discuss TKA.  PMH sig for HTN.  Reports itchiness upon administration of penicillin without anaphylactoid reactions.

## 2024-08-06 NOTE — DISCUSSION/SUMMARY
[de-identified] : 08/02/2024: 68 year old female 6 months s/p left total knee arthroplasty with at this point mild stable arthrofibrosis, otherwise continuing to improve from a functional standpoint.  - We discussed that the degree of arthrofibrosis is mature.  - I don't think she needs to attend any further formal PT, but I did recommend that she continue with an HEP. - She will follow up next in January 2025 with repeat bilateral knee XRs.

## 2024-08-06 NOTE — HISTORY OF PRESENT ILLNESS
[de-identified] : R TKA 6/6/22 L TKA 01/22/2024 08/02/2024: 68 year old female now about 6 months s/p left total knee arthroplasty. She had been continuing to participate in PT up until last month. She discontinued because she was living in [place] for a few weeks. She reports she has relatively mild, waxing/waning postop pain at this point. She is ambulating without the use of an assistive device.   11/10/2023: 68-year-old Portuguese-speaking female presenting about 14 months status post right total knee arthroplasty as well as follow-up for left knee osteoarthritis.  Patient was last seen in office on June of this year, was administered a LT knee cortisone injection. She states that the left knee cortisone injection did not provide her any benefit. She's been taking Motrin as needed for pain. She said she attended physical therapy and did not find much benefit from physical therapy at all thus far. She would like to discuss her left total knee replacement today.  06/13/2023: 68 y/o female f/u approximately 1 year s/p right TKA. She reports that the right knee is still somewhat painful when she hyperflexes the knee. Her chief complaint today is her right ankle which has been very painful for the past 2 weeks. She denies injury or fall but has been using a cane and Motrin due to severe pain. She states that her left knee lona and is painful consistently. She notes that the left knee is generally nonpainful but she has sudden unpredictable episodes of sharp pain that are associated with buckling.  This occurs on a daily basis.  She denies having any falls associated with these episodes.   2/23/22: 65y/o female, Portuguese speaking, accompanied by grandson for evaluation of bilateral knee pain and stiffness. Symptoms progressive for the past 4-5 years. Treatments thus far have included PT, Tylenol, various OTC NSAIDs, multiple corticosteroid injections, and bilateral knee arthroscopies (right 2017, left 2018). Complains of ongoing mostly medial pain with any activity. Ambulatory tolerance less than a block but remains able to perform ADLs with pain. Here to discuss TKA.  PMH sig for HTN.  Reports itchiness upon administration of penicillin without anaphylactoid reactions.

## 2024-08-06 NOTE — REASON FOR VISIT
[Follow-Up Visit] : a follow-up visit for [Joint Replacement Surgery, Aftercare] : joint replacement surgery, aftercare [Interpreters_IDNumber] : [id number] [Interpreters_FullName] : Mariusz [Interpreters_Relationshiptopatient] : [employer] [TWNoteComboBox1] : [place]

## 2024-08-06 NOTE — DISCUSSION/SUMMARY
[de-identified] : 08/02/2024: 68 year old female 6 months s/p left total knee arthroplasty with at this point mild stable arthrofibrosis, otherwise continuing to improve from a functional standpoint.  - We discussed that the degree of arthrofibrosis is mature.  - I don't think she needs to attend any further formal PT, but I did recommend that she continue with an HEP. - She will follow up next in January 2025 with repeat bilateral knee XRs.

## 2024-08-12 NOTE — PATIENT PROFILE ADULT - DO YOU LACK THE NECESSARY SUPPORT TO HELP YOU COPE WITH LIFE CHALLENGES?
Treatment Goal Met?: Yes
Treatment Goal Explanation (Does Not Render In The Note): Stable for the purposes of categorizing medical decision making is defined by the specific treatment goals for an individual patient. A patient that is not at their treatment goal is not stable, even if the condition has not changed and there is no short- term threat to life or function.
no

## 2025-01-24 ENCOUNTER — RESULT REVIEW (OUTPATIENT)
Age: 70
End: 2025-01-24

## 2025-01-24 ENCOUNTER — APPOINTMENT (OUTPATIENT)
Dept: ORTHOPEDIC SURGERY | Facility: CLINIC | Age: 70
End: 2025-01-24
Payer: MEDICARE

## 2025-01-24 ENCOUNTER — OUTPATIENT (OUTPATIENT)
Dept: OUTPATIENT SERVICES | Facility: HOSPITAL | Age: 70
LOS: 1 days | End: 2025-01-24
Payer: MEDICARE

## 2025-01-24 VITALS
WEIGHT: 169 LBS | OXYGEN SATURATION: 98 % | DIASTOLIC BLOOD PRESSURE: 76 MMHG | HEART RATE: 72 BPM | SYSTOLIC BLOOD PRESSURE: 138 MMHG | HEIGHT: 64 IN | BODY MASS INDEX: 28.85 KG/M2

## 2025-01-24 DIAGNOSIS — Z98.890 OTHER SPECIFIED POSTPROCEDURAL STATES: Chronic | ICD-10-CM

## 2025-01-24 DIAGNOSIS — Z96.652 AFTERCARE FOLLOWING JOINT REPLACEMENT SURGERY: ICD-10-CM

## 2025-01-24 DIAGNOSIS — Z47.1 AFTERCARE FOLLOWING JOINT REPLACEMENT SURGERY: ICD-10-CM

## 2025-01-24 DIAGNOSIS — Z96.651 AFTERCARE FOLLOWING JOINT REPLACEMENT SURGERY: ICD-10-CM

## 2025-01-24 PROCEDURE — 73564 X-RAY EXAM KNEE 4 OR MORE: CPT

## 2025-01-24 PROCEDURE — 99213 OFFICE O/P EST LOW 20 MIN: CPT

## 2025-01-24 PROCEDURE — 73564 X-RAY EXAM KNEE 4 OR MORE: CPT | Mod: 26,50

## 2025-01-28 NOTE — PATIENT PROFILE ADULT - FUNCTIONAL ASSESSMENT - BASIC MOBILITY ASSESSMENT TYPE
GOAL: pt will increase standing dynamic/static balance to good in order to improve safety with functional mobility in 4 weeks Admission

## 2025-02-03 ENCOUNTER — APPOINTMENT (OUTPATIENT)
Dept: INTERNAL MEDICINE | Facility: CLINIC | Age: 70
End: 2025-02-03

## 2025-02-10 NOTE — PHYSICAL THERAPY INITIAL EVALUATION ADULT - ADDITIONAL COMMENTS
Yocasta William, PT, DPT, PCS  Ext.      Patient is a community ambulator who lives alone in an elevator access apartment, no [facility]. Patient reports being independent with all ADLs prior and denies use of any assistive devices when ambulating.

## 2025-06-07 ENCOUNTER — EMERGENCY (EMERGENCY)
Facility: HOSPITAL | Age: 70
LOS: 1 days | End: 2025-06-07
Attending: EMERGENCY MEDICINE | Admitting: EMERGENCY MEDICINE
Payer: MEDICARE

## 2025-06-07 VITALS
HEART RATE: 76 BPM | DIASTOLIC BLOOD PRESSURE: 89 MMHG | RESPIRATION RATE: 18 BRPM | WEIGHT: 169.98 LBS | SYSTOLIC BLOOD PRESSURE: 154 MMHG | OXYGEN SATURATION: 96 % | TEMPERATURE: 98 F

## 2025-06-07 VITALS
TEMPERATURE: 98 F | SYSTOLIC BLOOD PRESSURE: 163 MMHG | DIASTOLIC BLOOD PRESSURE: 82 MMHG | HEART RATE: 62 BPM | OXYGEN SATURATION: 99 % | RESPIRATION RATE: 18 BRPM

## 2025-06-07 DIAGNOSIS — Z96.653 PRESENCE OF ARTIFICIAL KNEE JOINT, BILATERAL: ICD-10-CM

## 2025-06-07 DIAGNOSIS — E78.5 HYPERLIPIDEMIA, UNSPECIFIED: ICD-10-CM

## 2025-06-07 DIAGNOSIS — Z98.890 OTHER SPECIFIED POSTPROCEDURAL STATES: Chronic | ICD-10-CM

## 2025-06-07 DIAGNOSIS — Z88.0 ALLERGY STATUS TO PENICILLIN: ICD-10-CM

## 2025-06-07 DIAGNOSIS — M54.12 RADICULOPATHY, CERVICAL REGION: ICD-10-CM

## 2025-06-07 DIAGNOSIS — I10 ESSENTIAL (PRIMARY) HYPERTENSION: ICD-10-CM

## 2025-06-07 DIAGNOSIS — M54.2 CERVICALGIA: ICD-10-CM

## 2025-06-07 PROCEDURE — 72125 CT NECK SPINE W/O DYE: CPT | Mod: 26

## 2025-06-07 PROCEDURE — 99284 EMERGENCY DEPT VISIT MOD MDM: CPT

## 2025-06-07 PROCEDURE — 99284 EMERGENCY DEPT VISIT MOD MDM: CPT | Mod: 25

## 2025-06-07 PROCEDURE — 96372 THER/PROPH/DIAG INJ SC/IM: CPT

## 2025-06-07 PROCEDURE — 72125 CT NECK SPINE W/O DYE: CPT

## 2025-06-07 RX ORDER — LIDOCAINE HYDROCHLORIDE 20 MG/ML
1 JELLY TOPICAL ONCE
Refills: 0 | Status: COMPLETED | OUTPATIENT
Start: 2025-06-07 | End: 2025-06-07

## 2025-06-07 RX ORDER — KETOROLAC TROMETHAMINE 30 MG/ML
30 INJECTION, SOLUTION INTRAMUSCULAR; INTRAVENOUS ONCE
Refills: 0 | Status: DISCONTINUED | OUTPATIENT
Start: 2025-06-07 | End: 2025-06-07

## 2025-06-07 RX ORDER — IBUPROFEN 200 MG
1 TABLET ORAL
Qty: 20 | Refills: 0
Start: 2025-06-07

## 2025-06-07 RX ORDER — LIDOCAINE HYDROCHLORIDE 20 MG/ML
1 JELLY TOPICAL
Qty: 30 | Refills: 0
Start: 2025-06-07

## 2025-06-07 RX ADMIN — LIDOCAINE HYDROCHLORIDE 1 PATCH: 20 JELLY TOPICAL at 16:10

## 2025-06-07 RX ADMIN — KETOROLAC TROMETHAMINE 30 MILLIGRAM(S): 30 INJECTION, SOLUTION INTRAMUSCULAR; INTRAVENOUS at 16:10

## 2025-06-07 NOTE — ED PROVIDER NOTE - CLINICAL SUMMARY MEDICAL DECISION MAKING FREE TEXT BOX
68 y/o f with PMH of HLD, HTN, arthritis s/p knee replacement presents to ED with three week hx of neck pain radiating down left arm worse with position and movement.  Denies neck trauma, accident, fall.  No arm weakness or numbness, mild paresthesias.  No fever, chills, neck stiffness, headache.  Pt took Tylenol with little relief.      VSS  PE as above  CT ordered  Lido patch, IM toradol 70 y/o f with PMH of HLD, HTN, arthritis s/p knee replacement presents to ED with three week hx of neck pain radiating down left arm worse with position and movement.  Denies neck trauma, accident, fall.  No arm weakness or numbness, mild paresthesias.  No fever, chills, neck stiffness, headache.  Pt took Tylenol with little relief.      VSS  PE as above  CT ordered  Lido patch, IM toradol  Pt feeling moderately better in ED  Aware of CT findings  Follow up with spine - take pain meds as prescribed

## 2025-06-07 NOTE — ED PROVIDER NOTE - OBJECTIVE STATEMENT
68 y/o f with PMH of HLD, HTN, arthritis s/p knee replacement presents to ED with three week hx of neck pain radiating down left arm worse with position and movement.  Denies neck trauma, accident, fall.  No arm weakness or numbness, mild paresthesias.  No fever, chills, neck stiffness, headache.  Pt took Tylenol with little relief.

## 2025-06-07 NOTE — ED ADULT NURSE NOTE - OBJECTIVE STATEMENT
patient arrived to ED c/o neck pain radiating to left arm. per patient started 3 weeks ago, intermittent but past 3 days has been constant pain. endorses tingling sensation to left hand, posterior headache intermittent 3 weeks ago, constant 3 days. patient also has swollen area present posterior lower neck, upper mid back swelling. per patient had pain there for 3 weeks but swelling started three days ago. denies fever, cough, shortness of breath, nausea or vomiting. endorses intermittent dizziness. denies nausea, vomiting, shortness of breath.

## 2025-06-07 NOTE — ED PROVIDER NOTE - NSFOLLOWUPINSTRUCTIONS_ED_ALL_ED_FT
Take ibuprofen and use lidocaine patch as prescribed.  Follow up with spine recommended below.  Return to ED with any worsening neck pain, extremity weakness, other concerns.  Feel better soon.    Cervical Radiculopathy    WHAT YOU NEED TO KNOW:    What is cervical radiculopathy? Cervical radiculopathy is a painful condition that happens when a spinal nerve in your neck is pinched or irritated.  Vertebral Column    What causes cervical radiculopathy? Changes in the vertebrae (bones) and discs in your neck can put pressure on a spinal nerve. Discs are natural, spongy cushions between your vertebrae that allow your spine to move. The following can cause a pinched nerve:    Disc damage may occur if a disc flattens, bulges, or moves over time. An injury can also cause disc damage.    Cervical spondylosis is when the vertebrae in your neck break down. This normally occurs as you age.    Growths, such as tumors or cysts (fluid-filled lumps), may grow and press on the nerve.  What are the signs and symptoms of cervical radiculopathy? The most common symptom is sharp pain that travels from your neck all the way down your arm. You may have pain in your shoulder, chest, and hand. The pain may get worse with movement or when you cough or sneeze. You may also have any of the following:    Burning or tingling sensations in your neck or arm    Numbness or weakness in your arm or hand that makes it hard for you to  objects    Headaches  How is cervical radiculopathy diagnosed? Your healthcare provider will ask when and how your symptoms began. Your provider will gently press on your neck to check for tenderness and areas that are not shaped correctly. Your provider will also check your arms and hands for numbness or weakness. You may have any of the following:    Provocative tests are done to check your response to certain movements. Your provider will ask you to move your neck, shoulder, and arm in different ways. Some movements will increase your symptoms, while others will make you feel better.    An x-ray is a picture of the bones and tissues in your neck.    An MRI or a CT scan may be used to take pictures of your neck. The pictures can show problems and changes in your nerves, discs, and vertebrae. You may be given contrast liquid to help the pictures show up better. Tell the provider if you have ever had an allergic reaction to contrast liquid. Do not enter the MRI room with anything metal. Metal can cause serious injury. Tell the provider if you have any metal in or on your body.    An electromyography is also called an EMG. An EMG is done to test the function of your muscles and the nerves that control them. Electrodes (wires) are placed on the muscle being tested. Needles may be attached to the electrodes and placed in your skin. The electrical activity of your muscles and nerves is measured by a machine attached to the electrodes. Your muscles are tested at rest and during movement.  How is cervical radiculopathy treated?    NSAIDs decrease swelling and pain. This medicine can be bought without a doctor's order. This medicine can cause stomach bleeding or kidney problems in certain people. If you take blood thinner medicine, always ask your provider if NSAIDs are safe for you. Always read the medicine label and follow the directions on it before using this medicine.    Prescription pain medicine may be given to decrease pain. Do not wait until the pain is severe before you take this medicine.    Steroids help decrease pain and swelling. These may be given as a pill or as an injection in your neck. You may need more than 1 injection if your symptoms do not improve after the first treatment.    Physical therapy helps stretch and strengthen your muscles. Your physical therapist can teach you how to improve your posture and the way you hold your neck. The therapist may also teach you how to be safely active and avoid further injury. The therapist can also help you develop an exercise program that is safe for your back and neck.    Surgery may be used to treat a pinched nerve if other treatments have not helped after 6 to 12 weeks.  How can I manage my symptoms?    Ice helps decrease swelling and pain. Ice may also help prevent tissue damage. Use an ice pack, or put crushed ice in a plastic bag. Cover it with a towel and place it on your neck for 15 to 20 minutes every hour or as directed.    Rest when you feel it is needed. Slowly start to do more each day. Return to your daily activities as directed.    Wear a soft collar. You may be given a soft collar to support your neck while you sleep. Wear the soft collar only as directed.  Cervical Collars      Do light stretches and regular exercise. Your provider may suggest light stretches to help decrease stiffness in your neck and arm as you recover. After your pain is controlled, you may benefit from regular exercise. Ask what type of exercise is safe for your back and neck.    Review your work area. A comfortable work area can help prevent neck strain. Ask your employer for an ergonomic review to check the position of your desk, chair, phone, and computer. Make any necessary adjustments for your comfort.  When should I contact my healthcare provider?    You are losing weight without trying.    Your pain is worse, even with medicine.    One or both hands feel more numb than before, or you cannot move your fingers well.    You have questions or concerns about your condition or care.  CARE AGREEMENT:    You have the right to help plan your care. Learn about your health condition and how it may be treated. Discuss treatment options with your healthcare providers to decide what care you want to receive. You always have the right to refuse treatment.    © [employer] 1973, 2025

## 2025-06-07 NOTE — ED PROVIDER NOTE - PHYSICAL EXAMINATION
VITAL SIGNS: I have reviewed nursing notes and confirm.  CONSTITUTIONAL: Well-developed; well-nourished; in no acute distress.  SKIN: Agree with RN documentation regarding decubitus evaluation. Remainder of skin exam is warm and dry, no acute rash.  HEAD: Normocephalic; atraumatic.  EYES: PERRL, EOM intact; conjunctiva and sclera clear.  ENT: No nasal discharge; airway clear.  NECK: Supple; non tender.  CARD: S1, S2 normal; no murmurs, gallops, or rubs. Regular rate and rhythm.  RESP: No wheezes, rales or rhonchi.  ABD: Normal bowel sounds; soft; non-distended; non-tender; no hepatosplenomegaly.  EXT: Normal ROM. No clubbing, cyanosis or edema, + mild left lateral cervical neck tenderness, worse with movement but able to turn and touch chin, no meningeal signs  LYMPH: No acute cervical adenopathy.  NEURO: Alert & Oriented x 3. CN II-XII intact. No facial droop. Clear speech. EATON w/ 5/5 strength x 4 ext. Normal sensation. No pronator drift. No dysdidokinesia nor dysmetria. Normal heel-to-shin.  PSYCH: Cooperative, appropriate.

## 2025-06-07 NOTE — ED ADULT NURSE NOTE - NSFALLHARMRISKINTERV_ED_ALL_ED

## 2025-06-07 NOTE — ED PROVIDER NOTE - PATIENT PORTAL LINK FT
You can access the FollowMyHealth Patient Portal offered by [facility] by registering at the following website: [email or web address]. By joining "[employer]’s FollowMyHealth portal, you will also be able to view your health information using other applications (apps) compatible with our system.

## 2025-06-07 NOTE — ED ADULT NURSE NOTE - NSICDXPASTMEDICALHX_GEN_ALL_CORE_FT
PAST MEDICAL HISTORY:  H/O: obesity     Hyperlipemia     Hypertension     Lumbar radiculopathy     Osteoarthritis     Palpitation

## 2025-06-07 NOTE — ED ADULT TRIAGE NOTE - SPO2 (%)
SHIFT SUMMARY-
NO ACUTE EVENTS OVERNIGHT, PT. COMFORT CARE. MEDICATED PER EMAR. SLEPT T/O
MOST OF THE NIGHT, REPOSITIONED FOR COMFORT/PRN. CALL LIGHT WITHIN REACH AND
SIDE RAILS UPX2. WILL CONT TO MONITOR. 96

## 2025-06-10 PROBLEM — M48.02 SPINAL STENOSIS OF CERVICAL REGION WITH RADICULOPATHY: Status: ACTIVE | Noted: 2025-06-10

## 2025-06-10 PROBLEM — M47.22 OSTEOARTHRITIS OF SPINE WITH RADICULOPATHY, CERVICAL REGION: Status: ACTIVE | Noted: 2025-06-10

## 2025-06-13 ENCOUNTER — APPOINTMENT (OUTPATIENT)
Dept: NEUROSURGERY | Facility: CLINIC | Age: 70
End: 2025-06-13

## 2025-06-13 VITALS
WEIGHT: 169 LBS | HEIGHT: 64 IN | RESPIRATION RATE: 18 BRPM | SYSTOLIC BLOOD PRESSURE: 132 MMHG | BODY MASS INDEX: 28.85 KG/M2 | HEART RATE: 70 BPM | DIASTOLIC BLOOD PRESSURE: 81 MMHG | OXYGEN SATURATION: 98 %

## 2025-06-13 PROCEDURE — 99205 OFFICE O/P NEW HI 60 MIN: CPT

## 2025-06-13 RX ORDER — GABAPENTIN 100 MG/1
100 CAPSULE ORAL TWICE DAILY
Qty: 60 | Refills: 1 | Status: ACTIVE | COMMUNITY
Start: 2025-06-13 | End: 1900-01-01

## 2025-08-26 ENCOUNTER — NON-APPOINTMENT (OUTPATIENT)
Age: 70
End: 2025-08-26

## 2025-08-26 ENCOUNTER — APPOINTMENT (OUTPATIENT)
Dept: NEUROLOGY | Age: 70
End: 2025-08-26
Payer: MEDICARE

## 2025-08-26 DIAGNOSIS — M54.12 RADICULOPATHY, CERVICAL REGION: ICD-10-CM

## 2025-08-26 DIAGNOSIS — M54.12 SPINAL STENOSIS, CERVICAL REGION: ICD-10-CM

## 2025-08-26 DIAGNOSIS — M48.02 SPINAL STENOSIS, CERVICAL REGION: ICD-10-CM

## 2025-08-26 PROCEDURE — 95911 NRV CNDJ TEST 9-10 STUDIES: CPT

## 2025-08-26 PROCEDURE — 95886 MUSC TEST DONE W/N TEST COMP: CPT

## 2025-08-26 PROCEDURE — 99203 OFFICE O/P NEW LOW 30 MIN: CPT | Mod: 25

## 2025-09-08 ENCOUNTER — APPOINTMENT (OUTPATIENT)
Dept: NEUROSURGERY | Facility: CLINIC | Age: 70
End: 2025-09-08

## 2025-09-08 VITALS
SYSTOLIC BLOOD PRESSURE: 120 MMHG | OXYGEN SATURATION: 98 % | WEIGHT: 169 LBS | BODY MASS INDEX: 28.85 KG/M2 | HEIGHT: 64 IN | RESPIRATION RATE: 18 BRPM | HEART RATE: 73 BPM | DIASTOLIC BLOOD PRESSURE: 74 MMHG

## 2025-09-08 DIAGNOSIS — M48.02 SPINAL STENOSIS, CERVICAL REGION: ICD-10-CM

## 2025-09-08 DIAGNOSIS — M54.12 SPINAL STENOSIS, CERVICAL REGION: ICD-10-CM

## 2025-09-08 PROCEDURE — 99215 OFFICE O/P EST HI 40 MIN: CPT

## 2025-09-08 RX ORDER — CYCLOBENZAPRINE HYDROCHLORIDE 5 MG/1
5 TABLET, FILM COATED ORAL 3 TIMES DAILY
Qty: 90 | Refills: 1 | Status: ACTIVE | COMMUNITY
Start: 2025-09-08 | End: 1900-01-01

## (undated) DEVICE — SUT STRATAFIX SPIRAL MONOCRYL PLUS 3-0 30CM PS-1 UNDYED

## (undated) DEVICE — POLISHER OR CAUTERY TIP

## (undated) DEVICE — GLV 7 PROTEXIS ORTHO (CREAM)

## (undated) DEVICE — DRAPE LIGHT HANDLE COVER (BLUE)

## (undated) DEVICE — NDL 18G BLUNT FILL PINK

## (undated) DEVICE — WARMING BLANKET UPPER ADULT

## (undated) DEVICE — DRAPE STERI-DRAPE INCISE 32X33"

## (undated) DEVICE — PREP CHLORAPREP HI-LITE ORANGE 26ML

## (undated) DEVICE — SAW BLADE STRYKER SAGITTAL 81.5X12.5X1.19MM

## (undated) DEVICE — STRYKER 4-PORT MANIFOLD W/SPECIMEN COLLECTION

## (undated) DEVICE — SAW BLADE STRYKER SAGITTAL 25X86.5X1.32MM

## (undated) DEVICE — GLV 7.5 PROTEXIS ORTHO (CREAM)

## (undated) DEVICE — ROSA DRAPE ROBOTIC UNIT

## (undated) DEVICE — SUT STRATAFIX SPIRAL PDO 0 24CM CP-2

## (undated) DEVICE — SUT STRATAFIX SYMMETRIC PDS 1 45CM OS-6

## (undated) DEVICE — SUT VICRYL 0 36" CT-1 UNDYED

## (undated) DEVICE — WOUND IRR IRRISEPT W 0.5 CHG

## (undated) DEVICE — SUT ETHILON 3-0 18" PS-1

## (undated) DEVICE — PACK TOTAL KNEE

## (undated) DEVICE — VENODYNE/SCD SLEEVE CALF MEDIUM

## (undated) DEVICE — SUT VICRYL 2-0 27" CT-1 UNDYED

## (undated) DEVICE — SPECIMEN CONTAINER 4OZ

## (undated) DEVICE — ELCTR STRYKER NEPTUNE SMOKE EVACUATION PENCIL (GREEN)

## (undated) DEVICE — SAW BLADE STRYKER SAGITTAL DUAL CUT TEETH 35X64X.64MM

## (undated) DEVICE — DRSG AQUACEL 3.5 X 10"

## (undated) DEVICE — SUCTION YANKAUER NO CONTROL VENT

## (undated) DEVICE — SUT STRATAFIX SPIRAL PDO 1 30CM OS-6

## (undated) DEVICE — ROSA NAVITRACKER KIT KNEE/SPINE

## (undated) DEVICE — MARKING PEN W RULER

## (undated) DEVICE — DRAPE U POLY BLUE 60X72"

## (undated) DEVICE — HOOD T5 PEELAWAY

## (undated) DEVICE — ELCTR AQUAMANTYS BIPOLAR SEALER 6.0

## (undated) DEVICE — DRSG STOCKINETTE IMPERVIOUS XL

## (undated) DEVICE — DRSG DERMABOND PRINEO 22CM

## (undated) DEVICE — POSITIONER FOAM EGG CRATE ULNAR 2PCS (PINK)

## (undated) DEVICE — NDL HYPO SAFE 22G X 1.5" (BLACK)

## (undated) DEVICE — DRSG COBAN 6"

## (undated) DEVICE — GLV 7 PROTEXIS (WHITE)

## (undated) DEVICE — DRAPE TOWEL BLUE 17" X 24"

## (undated) DEVICE — GLV 7.5 PROTEXIS (WHITE)

## (undated) DEVICE — GLV 8 PROTEXIS (WHITE)

## (undated) DEVICE — WOUND IRR SURGIPHOR

## (undated) DEVICE — SYR ASEPTO

## (undated) DEVICE — DRAPE 1/2 SHEET 40X57"

## (undated) DEVICE — SYR LUER LOK 50CC

## (undated) DEVICE — DRAPE 3/4 SHEET 52X76"

## (undated) DEVICE — ELCTR BOVIE PENCIL HANDPIECE ROCKER SWITCH 15FT